# Patient Record
Sex: MALE | Race: WHITE | NOT HISPANIC OR LATINO | Employment: OTHER | URBAN - NONMETROPOLITAN AREA
[De-identification: names, ages, dates, MRNs, and addresses within clinical notes are randomized per-mention and may not be internally consistent; named-entity substitution may affect disease eponyms.]

---

## 2021-10-04 ENCOUNTER — APPOINTMENT (OUTPATIENT)
Dept: GENERAL RADIOLOGY | Facility: HOSPITAL | Age: 76
End: 2021-10-04

## 2021-10-04 ENCOUNTER — HOSPITAL ENCOUNTER (OUTPATIENT)
Facility: HOSPITAL | Age: 76
Setting detail: OBSERVATION
Discharge: HOME HEALTH | End: 2021-10-06
Attending: FAMILY MEDICINE | Admitting: FAMILY MEDICINE

## 2021-10-04 ENCOUNTER — APPOINTMENT (OUTPATIENT)
Dept: CT IMAGING | Facility: HOSPITAL | Age: 76
End: 2021-10-04

## 2021-10-04 DIAGNOSIS — Z78.9 DECREASED ACTIVITIES OF DAILY LIVING (ADL): ICD-10-CM

## 2021-10-04 DIAGNOSIS — R29.6 MULTIPLE FALLS: ICD-10-CM

## 2021-10-04 DIAGNOSIS — N39.0 ACUTE UTI: ICD-10-CM

## 2021-10-04 DIAGNOSIS — R68.89 DECREASED FUNCTIONAL ACTIVITY TOLERANCE: ICD-10-CM

## 2021-10-04 DIAGNOSIS — Z87.09 HISTORY OF CHRONIC OBSTRUCTIVE PULMONARY DISEASE: ICD-10-CM

## 2021-10-04 DIAGNOSIS — R53.1 GENERALIZED WEAKNESS: Primary | ICD-10-CM

## 2021-10-04 PROBLEM — R62.7 ADULT FAILURE TO THRIVE: Status: ACTIVE | Noted: 2021-10-04

## 2021-10-04 PROBLEM — E03.9 HYPOTHYROIDISM (ACQUIRED): Status: ACTIVE | Noted: 2021-10-04

## 2021-10-04 PROBLEM — H05.20 EXOPHTHALMOS: Status: ACTIVE | Noted: 2021-10-04

## 2021-10-04 LAB
ALBUMIN SERPL-MCNC: 3.4 G/DL (ref 3.5–5.2)
ALBUMIN/GLOB SERPL: 0.8 G/DL
ALP SERPL-CCNC: 70 U/L (ref 39–117)
ALT SERPL W P-5'-P-CCNC: 22 U/L (ref 1–41)
ANION GAP SERPL CALCULATED.3IONS-SCNC: 11 MMOL/L (ref 5–15)
ARTERIAL PATENCY WRIST A: ABNORMAL
AST SERPL-CCNC: 37 U/L (ref 1–40)
ATMOSPHERIC PRESS: 749 MMHG
BACTERIA UR QL AUTO: ABNORMAL /HPF
BASE EXCESS BLDA CALC-SCNC: 1.5 MMOL/L (ref 0–2)
BASOPHILS # BLD AUTO: 0.04 10*3/MM3 (ref 0–0.2)
BASOPHILS NFR BLD AUTO: 0.4 % (ref 0–1.5)
BDY SITE: ABNORMAL
BILIRUB SERPL-MCNC: 0.5 MG/DL (ref 0–1.2)
BILIRUB UR QL STRIP: NEGATIVE
BODY TEMPERATURE: 37 C
BUN SERPL-MCNC: 33 MG/DL (ref 8–23)
BUN/CREAT SERPL: 27 (ref 7–25)
CALCIUM SPEC-SCNC: 9 MG/DL (ref 8.6–10.5)
CHLORIDE SERPL-SCNC: 99 MMOL/L (ref 98–107)
CLARITY UR: ABNORMAL
CO2 SERPL-SCNC: 23 MMOL/L (ref 22–29)
COLOR UR: ABNORMAL
CREAT SERPL-MCNC: 1.22 MG/DL (ref 0.76–1.27)
D DIMER PPP FEU-MCNC: 1.57 MG/L (FEU) (ref 0–0.5)
D-LACTATE SERPL-SCNC: 1.4 MMOL/L (ref 0.5–2)
DEPRECATED RDW RBC AUTO: 41.8 FL (ref 37–54)
EOSINOPHIL # BLD AUTO: 0.02 10*3/MM3 (ref 0–0.4)
EOSINOPHIL NFR BLD AUTO: 0.2 % (ref 0.3–6.2)
ERYTHROCYTE [DISTWIDTH] IN BLOOD BY AUTOMATED COUNT: 13.3 % (ref 12.3–15.4)
GAS FLOW AIRWAY: 1.5 LPM
GFR SERPL CREATININE-BSD FRML MDRD: 58 ML/MIN/1.73
GLOBULIN UR ELPH-MCNC: 4.1 GM/DL
GLUCOSE SERPL-MCNC: 127 MG/DL (ref 65–99)
GLUCOSE UR STRIP-MCNC: NEGATIVE MG/DL
HCO3 BLDA-SCNC: 25.6 MMOL/L (ref 20–26)
HCT VFR BLD AUTO: 43.3 % (ref 37.5–51)
HGB BLD-MCNC: 14.2 G/DL (ref 13–17.7)
HGB UR QL STRIP.AUTO: ABNORMAL
HYALINE CASTS UR QL AUTO: ABNORMAL /LPF
IMM GRANULOCYTES # BLD AUTO: 0.06 10*3/MM3 (ref 0–0.05)
IMM GRANULOCYTES NFR BLD AUTO: 0.6 % (ref 0–0.5)
INR PPP: 1.16 (ref 0.91–1.09)
KETONES UR QL STRIP: ABNORMAL
LEUKOCYTE ESTERASE UR QL STRIP.AUTO: ABNORMAL
LYMPHOCYTES # BLD AUTO: 1.77 10*3/MM3 (ref 0.7–3.1)
LYMPHOCYTES NFR BLD AUTO: 18.2 % (ref 19.6–45.3)
Lab: ABNORMAL
MCH RBC QN AUTO: 27.9 PG (ref 26.6–33)
MCHC RBC AUTO-ENTMCNC: 32.8 G/DL (ref 31.5–35.7)
MCV RBC AUTO: 85.1 FL (ref 79–97)
MODALITY: ABNORMAL
MONOCYTES # BLD AUTO: 0.89 10*3/MM3 (ref 0.1–0.9)
MONOCYTES NFR BLD AUTO: 9.1 % (ref 5–12)
NEUTROPHILS NFR BLD AUTO: 6.95 10*3/MM3 (ref 1.7–7)
NEUTROPHILS NFR BLD AUTO: 71.5 % (ref 42.7–76)
NITRITE UR QL STRIP: NEGATIVE
NRBC BLD AUTO-RTO: 0 /100 WBC (ref 0–0.2)
NT-PROBNP SERPL-MCNC: 518.1 PG/ML (ref 0–1800)
PCO2 BLDA: 37.8 MM HG (ref 35–45)
PCO2 TEMP ADJ BLD: 37.8 MM HG (ref 35–45)
PH BLDA: 7.44 PH UNITS (ref 7.35–7.45)
PH UR STRIP.AUTO: 5.5 [PH] (ref 5–8)
PH, TEMP CORRECTED: 7.44 PH UNITS (ref 7.35–7.45)
PLATELET # BLD AUTO: 235 10*3/MM3 (ref 140–450)
PMV BLD AUTO: 10 FL (ref 6–12)
PO2 BLDA: 68.4 MM HG (ref 83–108)
PO2 TEMP ADJ BLD: 68.4 MM HG (ref 83–108)
POTASSIUM SERPL-SCNC: 4.2 MMOL/L (ref 3.5–5.2)
PROCALCITONIN SERPL-MCNC: 0.39 NG/ML (ref 0–0.25)
PROT SERPL-MCNC: 7.5 G/DL (ref 6–8.5)
PROT UR QL STRIP: ABNORMAL
PROTHROMBIN TIME: 14.4 SECONDS (ref 11.9–14.6)
RBC # BLD AUTO: 5.09 10*6/MM3 (ref 4.14–5.8)
RBC # UR: ABNORMAL /HPF
REF LAB TEST METHOD: ABNORMAL
SAO2 % BLDCOA: 94.7 % (ref 94–99)
SARS-COV-2 RNA PNL SPEC NAA+PROBE: NOT DETECTED
SODIUM SERPL-SCNC: 133 MMOL/L (ref 136–145)
SP GR UR STRIP: 1.02 (ref 1–1.03)
SQUAMOUS #/AREA URNS HPF: ABNORMAL /HPF
TROPONIN T SERPL-MCNC: <0.01 NG/ML (ref 0–0.03)
TSH SERPL DL<=0.05 MIU/L-ACNC: 2.53 UIU/ML (ref 0.27–4.2)
UROBILINOGEN UR QL STRIP: ABNORMAL
VENTILATOR MODE: ABNORMAL
WBC # BLD AUTO: 9.73 10*3/MM3 (ref 3.4–10.8)
WBC UR QL AUTO: ABNORMAL /HPF

## 2021-10-04 PROCEDURE — G0378 HOSPITAL OBSERVATION PER HR: HCPCS

## 2021-10-04 PROCEDURE — 83605 ASSAY OF LACTIC ACID: CPT | Performed by: NURSE PRACTITIONER

## 2021-10-04 PROCEDURE — 80053 COMPREHEN METABOLIC PANEL: CPT | Performed by: NURSE PRACTITIONER

## 2021-10-04 PROCEDURE — 0 IOPAMIDOL PER 1 ML: Performed by: NURSE PRACTITIONER

## 2021-10-04 PROCEDURE — 36600 WITHDRAWAL OF ARTERIAL BLOOD: CPT

## 2021-10-04 PROCEDURE — 99285 EMERGENCY DEPT VISIT HI MDM: CPT

## 2021-10-04 PROCEDURE — 85379 FIBRIN DEGRADATION QUANT: CPT | Performed by: NURSE PRACTITIONER

## 2021-10-04 PROCEDURE — 94799 UNLISTED PULMONARY SVC/PX: CPT

## 2021-10-04 PROCEDURE — 87077 CULTURE AEROBIC IDENTIFY: CPT | Performed by: NURSE PRACTITIONER

## 2021-10-04 PROCEDURE — 71045 X-RAY EXAM CHEST 1 VIEW: CPT

## 2021-10-04 PROCEDURE — 87635 SARS-COV-2 COVID-19 AMP PRB: CPT | Performed by: NURSE PRACTITIONER

## 2021-10-04 PROCEDURE — 87186 SC STD MICRODIL/AGAR DIL: CPT | Performed by: NURSE PRACTITIONER

## 2021-10-04 PROCEDURE — 84443 ASSAY THYROID STIM HORMONE: CPT | Performed by: NURSE PRACTITIONER

## 2021-10-04 PROCEDURE — C9803 HOPD COVID-19 SPEC COLLECT: HCPCS

## 2021-10-04 PROCEDURE — 83880 ASSAY OF NATRIURETIC PEPTIDE: CPT | Performed by: NURSE PRACTITIONER

## 2021-10-04 PROCEDURE — 85025 COMPLETE CBC W/AUTO DIFF WBC: CPT | Performed by: NURSE PRACTITIONER

## 2021-10-04 PROCEDURE — 93010 ELECTROCARDIOGRAM REPORT: CPT | Performed by: INTERNAL MEDICINE

## 2021-10-04 PROCEDURE — 71275 CT ANGIOGRAPHY CHEST: CPT

## 2021-10-04 PROCEDURE — 70450 CT HEAD/BRAIN W/O DYE: CPT

## 2021-10-04 PROCEDURE — 84145 PROCALCITONIN (PCT): CPT | Performed by: NURSE PRACTITIONER

## 2021-10-04 PROCEDURE — 93005 ELECTROCARDIOGRAM TRACING: CPT

## 2021-10-04 PROCEDURE — 82803 BLOOD GASES ANY COMBINATION: CPT

## 2021-10-04 PROCEDURE — 81001 URINALYSIS AUTO W/SCOPE: CPT | Performed by: NURSE PRACTITIONER

## 2021-10-04 PROCEDURE — 25010000002 CEFTRIAXONE PER 250 MG: Performed by: NURSE PRACTITIONER

## 2021-10-04 PROCEDURE — 87086 URINE CULTURE/COLONY COUNT: CPT | Performed by: NURSE PRACTITIONER

## 2021-10-04 PROCEDURE — 85610 PROTHROMBIN TIME: CPT | Performed by: NURSE PRACTITIONER

## 2021-10-04 PROCEDURE — 87040 BLOOD CULTURE FOR BACTERIA: CPT | Performed by: NURSE PRACTITIONER

## 2021-10-04 PROCEDURE — 84484 ASSAY OF TROPONIN QUANT: CPT | Performed by: NURSE PRACTITIONER

## 2021-10-04 RX ORDER — ACETAMINOPHEN 325 MG/1
650 TABLET ORAL EVERY 4 HOURS PRN
Status: DISCONTINUED | OUTPATIENT
Start: 2021-10-04 | End: 2021-10-06 | Stop reason: HOSPADM

## 2021-10-04 RX ORDER — SODIUM CHLORIDE 0.9 % (FLUSH) 0.9 %
10 SYRINGE (ML) INJECTION AS NEEDED
Status: DISCONTINUED | OUTPATIENT
Start: 2021-10-04 | End: 2021-10-06 | Stop reason: HOSPADM

## 2021-10-04 RX ORDER — ONDANSETRON 4 MG/1
4 TABLET, FILM COATED ORAL EVERY 6 HOURS PRN
Status: DISCONTINUED | OUTPATIENT
Start: 2021-10-04 | End: 2021-10-06 | Stop reason: HOSPADM

## 2021-10-04 RX ORDER — LEVOTHYROXINE SODIUM 0.1 MG/1
100 TABLET ORAL
Status: DISCONTINUED | OUTPATIENT
Start: 2021-10-05 | End: 2021-10-06 | Stop reason: HOSPADM

## 2021-10-04 RX ORDER — SODIUM CHLORIDE, SODIUM LACTATE, POTASSIUM CHLORIDE, CALCIUM CHLORIDE 600; 310; 30; 20 MG/100ML; MG/100ML; MG/100ML; MG/100ML
75 INJECTION, SOLUTION INTRAVENOUS CONTINUOUS
Status: DISCONTINUED | OUTPATIENT
Start: 2021-10-04 | End: 2021-10-06 | Stop reason: HOSPADM

## 2021-10-04 RX ORDER — IPRATROPIUM BROMIDE AND ALBUTEROL SULFATE 2.5; .5 MG/3ML; MG/3ML
3 SOLUTION RESPIRATORY (INHALATION) EVERY 6 HOURS PRN
Status: DISCONTINUED | OUTPATIENT
Start: 2021-10-04 | End: 2021-10-06 | Stop reason: HOSPADM

## 2021-10-04 RX ORDER — BUDESONIDE 0.5 MG/2ML
0.5 INHALANT ORAL
Status: DISCONTINUED | OUTPATIENT
Start: 2021-10-05 | End: 2021-10-06 | Stop reason: HOSPADM

## 2021-10-04 RX ORDER — ACETAMINOPHEN 160 MG/5ML
650 SOLUTION ORAL EVERY 4 HOURS PRN
Status: DISCONTINUED | OUTPATIENT
Start: 2021-10-04 | End: 2021-10-06 | Stop reason: HOSPADM

## 2021-10-04 RX ORDER — ALBUTEROL SULFATE 2.5 MG/3ML
2.5 SOLUTION RESPIRATORY (INHALATION) EVERY 4 HOURS PRN
COMMUNITY

## 2021-10-04 RX ORDER — SODIUM CHLORIDE 0.9 % (FLUSH) 0.9 %
10 SYRINGE (ML) INJECTION EVERY 12 HOURS SCHEDULED
Status: DISCONTINUED | OUTPATIENT
Start: 2021-10-04 | End: 2021-10-06 | Stop reason: HOSPADM

## 2021-10-04 RX ORDER — AMLODIPINE BESYLATE 10 MG/1
10 TABLET ORAL DAILY
Status: DISCONTINUED | OUTPATIENT
Start: 2021-10-05 | End: 2021-10-06 | Stop reason: HOSPADM

## 2021-10-04 RX ORDER — PROPRANOLOL HYDROCHLORIDE 10 MG/1
10 TABLET ORAL EVERY 12 HOURS SCHEDULED
Status: DISCONTINUED | OUTPATIENT
Start: 2021-10-04 | End: 2021-10-06 | Stop reason: HOSPADM

## 2021-10-04 RX ORDER — LISINOPRIL 10 MG/1
10 TABLET ORAL DAILY
Status: DISCONTINUED | OUTPATIENT
Start: 2021-10-05 | End: 2021-10-06 | Stop reason: HOSPADM

## 2021-10-04 RX ORDER — ONDANSETRON 2 MG/ML
4 INJECTION INTRAMUSCULAR; INTRAVENOUS EVERY 6 HOURS PRN
Status: DISCONTINUED | OUTPATIENT
Start: 2021-10-04 | End: 2021-10-06 | Stop reason: HOSPADM

## 2021-10-04 RX ADMIN — Medication 10 ML: at 22:28

## 2021-10-04 RX ADMIN — SODIUM CHLORIDE 500 ML: 9 INJECTION, SOLUTION INTRAVENOUS at 19:00

## 2021-10-04 RX ADMIN — PROPRANOLOL HYDROCHLORIDE 10 MG: 10 TABLET ORAL at 22:28

## 2021-10-04 RX ADMIN — SODIUM CHLORIDE 1 G: 9 INJECTION, SOLUTION INTRAVENOUS at 19:01

## 2021-10-04 RX ADMIN — IOPAMIDOL 100 ML: 755 INJECTION, SOLUTION INTRAVENOUS at 18:03

## 2021-10-04 RX ADMIN — SODIUM CHLORIDE, POTASSIUM CHLORIDE, SODIUM LACTATE AND CALCIUM CHLORIDE 75 ML/HR: 600; 310; 30; 20 INJECTION, SOLUTION INTRAVENOUS at 22:28

## 2021-10-04 NOTE — H&P
"    [facility]  HISTORY AND PHYSICAL    Date of Admission: 10/4/2021  Primary Care Physician: Ty Beal APRN    Subjective     Chief Complaint: Frequent falls and weakness    History of Present Illness  Angel Hernandez is a 76-year-old male with a past medical history of COPD, chronic respiratory failure on 3-4 L nasal cannula continuously, hypothyroidism, hyperlipidemia, hypertension.  Patient states he underwent cystoscopy per Dr. Medina at [facility] 7 days ago.  He presented to [facility] emergency department via EMS with complaints of 8-10 falls over the past 3 days.  He is reporting extreme weakness.  He is complaining of soreness in his shoulders and knees.  He has signs of injury on his upper extremities with healing areas of skin tears and bruising.  Patient states \"my brain scattered\".  Patient is extremely poor historian.  [facility]  ER work-up revealed PO2 of 1.5 L nasal cannula of 68.  GFR 58, sodium 133, a significant UTI.  Elevated D-dimer-CT of the chest positive for cholelithiasis, emphysema and COPD, negative PE, incidental finding 2 cm nodule on the left renal contour.  Patient has no other complaints.  He is admitted for further evaluation treatment.    Review of Systems   A 10 point review of systems was completed, all negative except for those discussed in HPI    Past Medical History:   Past Medical History:   Diagnosis Date   • Asthma    • COPD (chronic obstructive pulmonary disease) (HCC)    • Disease of thyroid gland    • Hyperlipidemia    • Hypertension        Past Surgical History:   Past Surgical History:   Procedure Laterality Date   • COLONOSCOPY  05/09/2011    unremarkable   • FRACTURE SURGERY      jaw   • WRIST SURGERY         Family History: family history includes Arthritis in his father; No Known Problems in his mother.  Patient states he is unable to remember " "his mother's history.  The only thing he remember about his father is he had arthritis and  in a relatively young age and MVA.    Social History:  reports that he has quit smoking. He has never used smokeless tobacco. He reports previous alcohol use.    Code Status: Full, patient states he has no one to speak for him if unable to pick for himself      Allergies:  No Known Allergies    Medications:  Prior to Admission medications    Medication Sig Start Date End Date Taking? Authorizing Provider   amLODIPine (NORVASC) 10 MG tablet Take 10 mg by mouth Daily.    Geeta Martinez MD   ipratropium-albuterol (COMBIVENT RESPIMAT)  MCG/ACT inhaler Inhale 1 puff 4 (Four) Times a Day As Needed for Wheezing.    Geeta Martinez MD   LEVOTHYROXINE SODIUM PO Take  by mouth.    Geeta Martinez MD   lisinopril (PRINIVIL,ZESTRIL) 10 MG tablet Take 10 mg by mouth Daily.    Geeta Martinez MD   mometasone (ASMANEX TWISTHALER) inhaler 220 mcg/inhalation Inhale 2 puffs Daily.    Geeta Martinez MD   propranolol (INDERAL) 10 MG tablet Take 10 mg by mouth 2 (Two) Times a Day.    Geeta Martinez MD       Objective     /61 (Patient Position: Standing)   Pulse 70   Temp 97.8 °F (36.6 °C) (Oral)   Resp 21   Ht 172.7 cm (68\")   Wt 84.8 kg (187 lb)   SpO2 96%   BMI 28.43 kg/m²   Physical Exam  Vitals reviewed.   Constitutional:       Appearance: He is ill-appearing.   HENT:      Head: Normocephalic and atraumatic.      Mouth/Throat:      Mouth: Mucous membranes are moist.      Pharynx: Oropharynx is clear.      Comments: Edentulous  Eyes:      Extraocular Movements: Extraocular movements intact.      Conjunctiva/sclera:      Right eye: Right conjunctiva is injected.      Left eye: Left conjunctiva is injected.      Pupils: Pupils are equal, round, and reactive to light.      Comments: exophthalmus    Cardiovascular:      Rate and Rhythm: Normal rate and regular rhythm.   Pulmonary:      " Effort: Pulmonary effort is normal.      Comments: Diminished throughout without adventitious breath sounds  Abdominal:      General: Bowel sounds are normal.      Palpations: Abdomen is soft.   Musculoskeletal:      Cervical back: Normal range of motion and neck supple.      Comments: Generalized weakness and debility   Skin:     General: Skin is warm and dry.      Findings: Bruising present.             Comments: Multiple areas of bruising, skin tears noted left upper extremity   Neurological:      General: No focal deficit present.      Mental Status: He is alert and oriented to person, place, and time.      Comments: Extremely poor memory, poor historian   Psychiatric:         Mood and Affect: Mood normal.         Behavior: Behavior normal.       Pertinent Data:   Lab Results (last 72 hours)     Procedure Component Value Units Date/Time    Lactic Acid, Plasma [[id number]] Collected: 10/04/21 1901    Specimen: Blood Updated: 10/04/21 1909    Blood Culture - Blood, Arm, Left [[id number]] Collected: 10/04/21 1821    Specimen: Blood from Arm, Left Updated: 10/04/21 1836    COVID-19,[facility] IN-HOUSE,Nasal Swab No Transport Media 3-4 HR TAT - Swab, Nasal Cavity [[id number]]  (Normal) Collected: 10/04/21 1639    Specimen: Swab from Nasal Cavity Updated: 10/04/21 1734     COVID19 Not Detected    Urinalysis With Culture If Indicated - Urine, Clean Catch [[id number]]  (Abnormal) Collected: 10/04/21 1719    Specimen: Urine, Clean Catch Updated: 10/04/21 1729     Color, UA Dark Yellow     Appearance, UA Cloudy     pH, UA 5.5     Specific Gravity, UA 1.022     Glucose, UA Negative     Ketones, UA 15 mg/dL (1+)     Bilirubin, UA Negative     Blood, UA Large (3+)     Protein, UA >=300 mg/dL (3+)     Leuk Esterase, UA Moderate (2+)     Nitrite, UA Negative     Urobilinogen, UA 1.0 E.U./dL    Urinalysis, Microscopic Only - Urine, Clean Catch [[id number]]  (Abnormal) Collected: 10/04/21 1719    Specimen: Urine, Clean Catch Updated:  10/04/21 1729     RBC, UA 6-12 /HPF      WBC, UA Too Numerous to Count /HPF      Bacteria, UA 4+ /HPF      Squamous Epithelial Cells, UA 0-2 /HPF      Hyaline Casts, UA 0-2 /LPF      Methodology Automated Microscopy    Urine Culture - Urine, Urine, Clean Catch [[id number]] Collected: 10/04/21 1719    Specimen: Urine, Clean Catch Updated: 10/04/21 1729    TSH [[id number]]  (Normal) Collected: 10/04/21 1637    Specimen: Blood Updated: 10/04/21 1716     TSH 2.530 uIU/mL     Comprehensive Metabolic Panel [[id number]]  (Abnormal) Collected: 10/04/21 1637    Specimen: Blood Updated: 10/04/21 1712     Glucose 127 mg/dL      BUN 33 mg/dL      Creatinine 1.22 mg/dL      Sodium 133 mmol/L      Potassium 4.2 mmol/L      Chloride 99 mmol/L      CO2 23.0 mmol/L      Calcium 9.0 mg/dL      Total Protein 7.5 g/dL      Albumin 3.40 g/dL      ALT (SGPT) 22 U/L      AST (SGOT) 37 U/L      Alkaline Phosphatase 70 U/L      Total Bilirubin 0.5 mg/dL      eGFR Non African Amer 58 mL/min/1.73      Globulin 4.1 gm/dL      A/G Ratio 0.8 g/dL      BUN/Creatinine Ratio 27.0     Anion Gap 11.0 mmol/L     Troponin [[id number]]  (Normal) Collected: 10/04/21 1637    Specimen: Blood Updated: 10/04/21 1710     Troponin T <0.010 ng/mL     BNP [[id number]]  (Normal) Collected: 10/04/21 1637    Specimen: Blood Updated: 10/04/21 1709     proBNP 518.1 pg/mL     D-dimer, Quantitative [[id number]]  (Abnormal) Collected: 10/04/21 1637    Specimen: Blood Updated: 10/04/21 1709     D-Dimer, Quantitative 1.57 mg/L (FEU)     Protime-INR [[id number]]  (Abnormal) Collected: 10/04/21 1637    Specimen: Blood Updated: 10/04/21 1709     Protime 14.4 Seconds      INR 1.16    CBC Auto Differential [[id number]]  (Abnormal) Collected: 10/04/21 1637    Specimen: Blood Updated: 10/04/21 1651     WBC 9.73 10*3/mm3      RBC 5.09 10*6/mm3      Hemoglobin 14.2 g/dL      Hematocrit 43.3 %      MCV 85.1 fL      MCH 27.9 pg      MCHC 32.8 g/dL      RDW 13.3 %      RDW-SD 41.8 fl       MPV 10.0 fL      Platelets 235 10*3/mm3      Neutrophil % 71.5 %      Lymphocyte % 18.2 %      Monocyte % 9.1 %      Eosinophil % 0.2 %      Basophil % 0.4 %      Immature Grans % 0.6 %      Neutrophils, Absolute 6.95 10*3/mm3      Lymphocytes, Absolute 1.77 10*3/mm3      Monocytes, Absolute 0.89 10*3/mm3      Eosinophils, Absolute 0.02 10*3/mm3      Basophils, Absolute 0.04 10*3/mm3      Immature Grans, Absolute 0.06 10*3/mm3      nRBC 0.0 /100 WBC     Blood Gas, Arterial - [[id number]]  (Abnormal) Collected: 10/04/21 1640    Specimen: Arterial Blood Updated: 10/04/21 1648     Site Right Brachial     Quinten's Test N/A     pH, Arterial 7.439 pH units      pCO2, Arterial 37.8 mm Hg      pO2, Arterial 68.4 mm Hg      Comment: 84 Value below reference range        HCO3, Arterial 25.6 mmol/L      Base Excess, Arterial 1.5 mmol/L      O2 Saturation, Arterial 94.7 %      Temperature 37.0 C      Barometric Pressure for Blood Gas 749 mmHg      Modality Nasal Cannula     Flow Rate 1.5 lpm      Ventilator Mode NA     Collected by [id number]     Comment: Meter: [id number]     :  [id number]        pCO2, Temperature Corrected 37.8 mm Hg      pH, Temp Corrected 7.439 pH Units      pO2, Temperature Corrected 68.4 mm Hg         Imaging Results (Last 24 Hours)     Procedure Component Value Units Date/Time    CT Angiogram Chest [[id number]] Collected: 10/04/21 1818     Updated: 10/04/21 1827    Narrative:      CT ANGIOGRAM CHEST- 10/4/2021 5:49 PM CDT      HISTORY: generalized weakness/ dyspnea/ elvated dimer      COMPARISON: None.      DOSE LENGTH PRODUCT: 407 mGy cm. Automated exposure control was also  utilized to decrease patient radiation dose.     TECHNIQUE: Helical tomographic images of the chest were obtained after  the administration of intravenous contrast following angiogram protocol.  Additionally, 3D and multiplanar reformatted images were provided.        FINDINGS:    Pulmonary arteries: There is adequate  enhancement of the pulmonary  arteries to evaluate for central and segmental pulmonary emboli. There  are no filling defects within the main, lobar, segmental or visualized  subsegmental pulmonary arteries. The pulmonary arteries are within  normal limits for size.      Aorta and great vessels: Vascular calcifications present aortic arch..  The great vessels are normal in appearance.     Visualized neck base: The imaged portion of the base of the neck appears  unremarkable.      Lungs: Emphysematous changes noted in the pulmonary parenchyma.. The  trachea and bronchial tree are patent.      Heart: The heart is normal in size. Coronary calcifications are  present..      Mediastinum and lymph nodes: No enlarged mediastinal, hilar, or axillary  lymph nodes are present.      Skeletal and soft tissues: The osseous structures of the thorax and  surrounding soft tissues demonstrate no acute process.     Upper abdomen: Calculi are present in the gallbladder..        Impression:      1. No evidence of pulmonary embolus.        2. Emphysema and COPD.  3. Cholelithiasis  4. 2 cm nodule on the left renal contour is incompletely evaluated..        This report was finalized on 10/04/2021 18:24 by Dr. Austin Monzon MD.    CT Head Without Contrast [[id number]] Collected: 10/04/21 1815     Updated: 10/04/21 1820    Narrative:      EXAMINATION:   CT HEAD WO CONTRAST-  10/4/2021 6:15 PM CDT     HISTORY: Patient fell CT BRAIN without contrast 10/4/2021 5:49 PM CDT     HISTORY: Generalized weakness     COMPARISON: None      DLP: 700 mGy cm. In order to have a CT radiation dose as low as  reasonably achievable, Automated Exposure Control was utilized for  adjustment of the mA and/or KV according to patient size.     TECHNIQUE: Serial axial tomographic images of the brain were obtained  without the use of intravenous contrast.      FINDINGS:   The midline structures are nondisplaced. There is mild cerebral and  cerebellar volume loss,  with an associated increase in the prominence of  the ventricles and sulci. The basilar cisterns are normal in size and  configuration. There is no evidence of intracranial hemorrhage or  mass-effect. There is low attenuation in the periventricular white  matter, consistent with chronic ischemic change. Old lacunar infarctions  noted right basal ganglia There are no abnormal extra-axial fluid  collections. There is no evidence of tonsillar herniation.      Exophthalmus is noted.. The visualized paranasal sinuses, mastoid air  cells and middle ear cavities are clear. The visualized osseous  structures and overlying soft tissues of the skull and face are intact.        Impression:      Changes of aging with no acute intracranial abnormality..        2. Exophthalmos        This report was finalized on 10/04/2021 18:17 by Dr. Austin Monzon MD.    XR Chest 1 View [[id number]] Collected: 10/04/21 1629     Updated: 10/04/21 1633    Narrative:      EXAMINATION: XR CHEST 1 VW-     10/4/2021 4:26 PM CDT     HISTORY: dyspnea     1 view chest x-ray.     No comparison.     Heart size is normal.  The mediastinum is within normal limits.      The lungs are mildly hyperexpanded with no pneumonia or pneumothorax.  Mild chronic appearing interstitial disease with a few calcified  granulomas.  No congestive failure changes.                                                                       Impression:      1. No acute disease.        This report was finalized on 10/04/2021 16:30 by Dr. Harsh Jama MD.          Assessment / Plan     Assessment:   Active Hospital Problems    Diagnosis    • **Acute UTI (urinary tract infection)    • Generalized weakness    • Frequent falls    • Adult failure to thrive    • Hypothyroidism (acquired)    • Exophthalmos    Incidental finding-2 cm left renal nodule  UTI status post cystoscopy 9/28/2021    Plan:   1.  Admit as duration  2.  Continue Rocephin 1 g IV daily  3.  Gentle hydration LR at 75  mL/hour  4.  Home medications reviewed and restarted as appropriate, unsure patient's list is correct  5.  DVT prophylaxis with SCDs  6.  Official tears every 4 hours  7.  Supplemental oxygen, continuous pulse oximetry, ABGs as needed, incentive spirometry, duo nebs as needed for shortness of breathing or wheezing  8.  Limited renal ultrasound-left renal nodule, left lower extremity venous Doppler study in a.m.  9.  Labs in a.m.  10.  Consult PT and OT for strengthening  11.  Consult , patient may need home health    I discussed the patient's findings and my recommendations with: Yair Zamora MD  Time spent 40 minutes    Patient seen and examined by me on 10/4/2021 at 6:35 PM.    Electronically signed by AYAKA Dinh, 10/04/21, 19:26 CDT.

## 2021-10-04 NOTE — ED PROVIDER NOTES
Subjective   Patient is a 76-year-old white male presents the emergency department with generalized weakness and increased shortness of breath over the past 4 days.  Patient states that he has fallen several times over the past 2 days due to generalized weakness.  He states today that he has been short of breath even though he has had his oxygen on all day.  He states he is so weak he can hardly stand.  He denies any chest pain.  He denies any fever or chills.  No nausea or vomiting.  No focal weakness      History provided by:  Patient   used: No        Review of Systems   Constitutional: Negative.    HENT: Negative.    Eyes: Negative.    Respiratory:        Patient is a 76-year-old white male presents the emergency department with generalized weakness and increased shortness of breath over the past 4 days.  Patient states that he has fallen several times over the past 2 days due to generalized weakness.  He states today that he has been short of breath even though he has had his oxygen on all day.  He states he is so weak he can hardly stand.  He denies any chest pain.  He denies any fever or chills.  No nausea or vomiting.  No focal weakness.  Patient is a past smoker.  He states he stopped smoking about 2 years ago.  He does have a history of COPD and is oxygen dependent at home.     Cardiovascular: Negative.    Gastrointestinal: Negative.    Endocrine: Negative.    Genitourinary: Negative.    Musculoskeletal: Negative.    Skin: Negative.    Allergic/Immunologic: Negative.    Neurological: Negative.    Hematological: Negative.    Psychiatric/Behavioral: Negative.    All other systems reviewed and are negative.      Past Medical History:   Diagnosis Date   • Asthma    • COPD (chronic obstructive pulmonary disease) (HCC)    • Disease of thyroid gland    • Hyperlipidemia    • Hypertension        No Known Allergies    Past Surgical History:   Procedure Laterality Date   • COLONOSCOPY  05/09/2011  "   unremarkable   • FRACTURE SURGERY      jaw   • WRIST SURGERY         Family History   Problem Relation Age of Onset   • No Known Problems Mother    • Arthritis Father        Social History     Socioeconomic History   • Marital status:      Spouse name: Not on file   • Number of children: Not on file   • Years of education: Not on file   • Highest education level: Not on file   Tobacco Use   • Smoking status: Former Smoker   • Smokeless tobacco: Never Used   Substance and Sexual Activity   • Alcohol use: Not Currently   • Sexual activity: Defer       Prior to Admission medications    Medication Sig Start Date End Date Taking? Authorizing Provider   amLODIPine (NORVASC) 10 MG tablet Take 10 mg by mouth Daily.    Geeta Martinez MD   ipratropium-albuterol (COMBIVENT RESPIMAT)  MCG/ACT inhaler Inhale 1 puff 4 (Four) Times a Day As Needed for Wheezing.    Geeta Martinez MD   LEVOTHYROXINE SODIUM PO Take  by mouth.    Geeta Martinez MD   lisinopril (PRINIVIL,ZESTRIL) 10 MG tablet Take 10 mg by mouth Daily.    Geeta Martinez MD   mometasone (ASMANEX TWISTHALER) inhaler 220 mcg/inhalation Inhale 2 puffs Daily.    Geeta Martinez MD   propranolol (INDERAL) 10 MG tablet Take 10 mg by mouth 2 (Two) Times a Day.    ProviderGeeta MD       /61 (Patient Position: Standing)   Pulse 70   Temp 97.8 °F (36.6 °C) (Oral)   Resp 21   Ht 172.7 cm (68\")   Wt 84.8 kg (187 lb)   SpO2 96%   BMI 28.43 kg/m²     Objective   Physical Exam  Vitals and nursing note reviewed.   Constitutional:       Appearance: He is well-developed.      Comments: Chronically ill-appearing.  No acute distress.  Nontoxic-appearing   HENT:      Head: Normocephalic and atraumatic.      Comments: There is some ecchymosis noted to the right frontal forehead.  Eyes:      Conjunctiva/sclera: Conjunctivae normal.      Pupils: Pupils are equal, round, and reactive to light.      Comments: exophthalmus " noted    Cardiovascular:      Rate and Rhythm: Normal rate and regular rhythm.      Heart sounds: Normal heart sounds.   Pulmonary:      Effort: Pulmonary effort is normal.      Comments: Expiratory wheezing noted throughout.  There is some diminished lung sounds bilateral bases  Abdominal:      General: Bowel sounds are normal.      Palpations: Abdomen is soft.   Musculoskeletal:         General: Normal range of motion.      Cervical back: Normal range of motion and neck supple.      Right lower leg: Edema present.      Left lower leg: Edema present.   Skin:     General: Skin is warm and dry.   Neurological:      Mental Status: He is alert and oriented to person, place, and time.      Deep Tendon Reflexes: Reflexes are normal and symmetric.   Psychiatric:         Behavior: Behavior normal.         Thought Content: Thought content normal.         Judgment: Judgment normal.         Procedures         Lab Results (last 24 hours)     Procedure Component Value Units Date/Time    Comprehensive Metabolic Panel [[id number]]  (Abnormal) Collected: 10/04/21 1637    Specimen: Blood Updated: 10/04/21 1712     Glucose 127 mg/dL      BUN 33 mg/dL      Creatinine 1.22 mg/dL      Sodium 133 mmol/L      Potassium 4.2 mmol/L      Chloride 99 mmol/L      CO2 23.0 mmol/L      Calcium 9.0 mg/dL      Total Protein 7.5 g/dL      Albumin 3.40 g/dL      ALT (SGPT) 22 U/L      AST (SGOT) 37 U/L      Alkaline Phosphatase 70 U/L      Total Bilirubin 0.5 mg/dL      eGFR Non African Amer 58 mL/min/1.73      Globulin 4.1 gm/dL      A/G Ratio 0.8 g/dL      BUN/Creatinine Ratio 27.0     Anion Gap 11.0 mmol/L     Narrative:      GFR Normal >60  Chronic Kidney Disease <60  Kidney Failure <15      CBC & Differential [[id number]]  (Abnormal) Collected: 10/04/21 1637    Specimen: Blood Updated: 10/04/21 1651    Narrative:      The following orders were created for panel order CBC & Differential.  Procedure                               Abnormality          Status                     ---------                               -----------         ------                     CBC Auto Differential[[id number]]        Abnormal            Final result                 Please view results for these tests on the individual orders.    Protime-INR [[id number]]  (Abnormal) Collected: 10/04/21 1637    Specimen: Blood Updated: 10/04/21 1709     Protime 14.4 Seconds      INR 1.16    BNP [[id number]]  (Normal) Collected: 10/04/21 1637    Specimen: Blood Updated: 10/04/21 1709     proBNP 518.1 pg/mL     Narrative:      Among patients with dyspnea, NT-proBNP is highly sensitive for the detection of acute congestive heart failure. In addition NT-proBNP of <300 pg/ml effectively rules out acute congestive heart failure with 99% negative predictive value.    Results may be falsely decreased if patient taking Biotin.      Troponin [[id number]]  (Normal) Collected: 10/04/21 1637    Specimen: Blood Updated: 10/04/21 1710     Troponin T <0.010 ng/mL     Narrative:      Troponin T Reference Range:  <= 0.03 ng/mL-   Negative for AMI  >0.03 ng/mL-     Abnormal for myocardial necrosis.  Clinicians would have to utilize clinical acumen, EKG, Troponin and serial changes to determine if it is an Acute Myocardial Infarction or myocardial injury due to an underlying chronic condition.       Results may be falsely decreased if patient taking Biotin.      D-dimer, Quantitative [[id number]]  (Abnormal) Collected: 10/04/21 1637    Specimen: Blood Updated: 10/04/21 1709     D-Dimer, Quantitative 1.57 mg/L (FEU)     Narrative:      Reference Range is 0-0.50 mg/L FEU. However, results <0.50 mg/L FEU tends to rule out DVT or PE. Results >0.50 mg/L FEU are not useful in predicting absence or presence of DVT or PE.      CBC Auto Differential [[id number]]  (Abnormal) Collected: 10/04/21 1637    Specimen: Blood Updated: 10/04/21 1651     WBC 9.73 10*3/mm3      RBC 5.09 10*6/mm3      Hemoglobin 14.2 g/dL      Hematocrit  43.3 %      MCV 85.1 fL      MCH 27.9 pg      MCHC 32.8 g/dL      RDW 13.3 %      RDW-SD 41.8 fl      MPV 10.0 fL      Platelets 235 10*3/mm3      Neutrophil % 71.5 %      Lymphocyte % 18.2 %      Monocyte % 9.1 %      Eosinophil % 0.2 %      Basophil % 0.4 %      Immature Grans % 0.6 %      Neutrophils, Absolute 6.95 10*3/mm3      Lymphocytes, Absolute 1.77 10*3/mm3      Monocytes, Absolute 0.89 10*3/mm3      Eosinophils, Absolute 0.02 10*3/mm3      Basophils, Absolute 0.04 10*3/mm3      Immature Grans, Absolute 0.06 10*3/mm3      nRBC 0.0 /100 WBC     TSH [[id number]]  (Normal) Collected: 10/04/21 1637    Specimen: Blood Updated: 10/04/21 1716     TSH 2.530 uIU/mL     Procalcitonin [[id number]] Collected: 10/04/21 1637    Specimen: Blood Updated: 10/04/21 1916    COVID PRE-OP / PRE-PROCEDURE SCREENING ORDER (NO ISOLATION) - Swab, Nasal Cavity [[id number]]  (Normal) Collected: 10/04/21 1639    Specimen: Swab from Nasal Cavity Updated: 10/04/21 1734    Narrative:      The following orders were created for panel order COVID PRE-OP / PRE-PROCEDURE SCREENING ORDER (NO ISOLATION) - Swab, Nasal Cavity.  Procedure                               Abnormality         Status                     ---------                               -----------         ------                     COVID-19,[facility]...[[id number]]  Normal              Final result                 Please view results for these tests on the individual orders.    COVID-19,[facility] IN-HOUSE,Nasal Swab No Transport Media 3-4 HR TAT - Swab, Nasal Cavity [[id number]]  (Normal) Collected: 10/04/21 1639    Specimen: Swab from Nasal Cavity Updated: 10/04/21 1734     COVID19 Not Detected    Narrative:      Fact sheet for providers: https://www.fda.gov/media/047920/download     Fact sheet for patients: https://www.fda.gov/media/014757/download    Test performed by PCR.    Consider negative results in combination with clinical observations, patient history, and  epidemiological information.  Fact sheet for providers: https://www.fda.gov/media/118912/download     Fact sheet for patients: https://www.fda.gov/media/025362/download    Test performed by PCR.    Consider negative results in combination with clinical observations, patient history, and epidemiological information.    Blood Gas, Arterial - [[id number]]  (Abnormal) Collected: 10/04/21 1640    Specimen: Arterial Blood Updated: 10/04/21 1648     Site Right Brachial     Quinten's Test N/A     pH, Arterial 7.439 pH units      pCO2, Arterial 37.8 mm Hg      pO2, Arterial 68.4 mm Hg      Comment: 84 Value below reference range        HCO3, Arterial 25.6 mmol/L      Base Excess, Arterial 1.5 mmol/L      O2 Saturation, Arterial 94.7 %      Temperature 37.0 C      Barometric Pressure for Blood Gas 749 mmHg      Modality Nasal Cannula     Flow Rate 1.5 lpm      Ventilator Mode NA     Collected by [id number]     Comment: Meter: [id number]     :  [id number]        pCO2, Temperature Corrected 37.8 mm Hg      pH, Temp Corrected 7.439 pH Units      pO2, Temperature Corrected 68.4 mm Hg     Urinalysis With Culture If Indicated - Urine, Clean Catch [[id number]]  (Abnormal) Collected: 10/04/21 1719    Specimen: Urine, Clean Catch Updated: 10/04/21 1729     Color, UA Dark Yellow     Appearance, UA Cloudy     pH, UA 5.5     Specific Gravity, UA 1.022     Glucose, UA Negative     Ketones, UA 15 mg/dL (1+)     Bilirubin, UA Negative     Blood, UA Large (3+)     Protein, UA >=300 mg/dL (3+)     Leuk Esterase, UA Moderate (2+)     Nitrite, UA Negative     Urobilinogen, UA 1.0 E.U./dL    Urinalysis, Microscopic Only - Urine, Clean Catch [[id number]]  (Abnormal) Collected: 10/04/21 1719    Specimen: Urine, Clean Catch Updated: 10/04/21 1729     RBC, UA 6-12 /HPF      WBC, UA Too Numerous to Count /HPF      Bacteria, UA 4+ /HPF      Squamous Epithelial Cells, UA 0-2 /HPF      Hyaline Casts, UA 0-2 /LPF      Methodology Automated Microscopy     Urine Culture - Urine, Urine, Clean Catch [[id number]] Collected: 10/04/21 1719    Specimen: Urine, Clean Catch Updated: 10/04/21 1729    Blood Culture - Blood, Arm, Left [[id number]] Collected: 10/04/21 1821    Specimen: Blood from Arm, Left Updated: 10/04/21 1836    Lactic Acid, Plasma [[id number]] Collected: 10/04/21 1901    Specimen: Blood Updated: 10/04/21 1909          CT Head Without Contrast   Final Result   Changes of aging with no acute intracranial abnormality..           2. Exophthalmos           This report was finalized on 10/04/2021 18:17 by Dr. Austin Monzon MD.      CT Angiogram Chest   Final Result   1. No evidence of pulmonary embolus.           2. Emphysema and COPD.   3. Cholelithiasis   4. 2 cm nodule on the left renal contour is incompletely evaluated..           This report was finalized on 10/04/2021 18:24 by Dr. Austin Monzon MD.      XR Chest 1 View   Final Result   1. No acute disease.           This report was finalized on 10/04/2021 16:30 by Dr. Harsh Jama MD.      US Venous Doppler Lower Extremity Right (duplex)    (Results Pending)   US Renal Limited    (Results Pending)       ED Course  ED Course as of Oct 04 1926   Mon Oct 04, 2021   1829 Reviewed pt and pt care plan with dr montenegro- also in agreement with pt care plan. Pt states that he feels very weak all over. Call placed to hospitalist at this time     [CW]   1839 Spoke with dr victor- has accepted for admission. Will be admitted soon in stable condition     [CW]      ED Course User Index  [CW] Elma Ervin, APRN          MDM  Number of Diagnoses or Management Options  Acute UTI: new and requires workup  Generalized weakness: new and requires workup  History of chronic obstructive pulmonary disease: minor  Multiple falls: new and requires workup     Amount and/or Complexity of Data Reviewed  Clinical lab tests: ordered and reviewed  Tests in the radiology section of CPT®: ordered and reviewed    Patient  Progress  Patient progress: stable      Final diagnoses:   Generalized weakness   Acute UTI   Multiple falls   History of chronic obstructive pulmonary disease          Elma Ervin, APRN  10/04/21 1926

## 2021-10-05 ENCOUNTER — APPOINTMENT (OUTPATIENT)
Dept: ULTRASOUND IMAGING | Facility: HOSPITAL | Age: 76
End: 2021-10-05

## 2021-10-05 LAB
ANION GAP SERPL CALCULATED.3IONS-SCNC: 11 MMOL/L (ref 5–15)
BUN SERPL-MCNC: 29 MG/DL (ref 8–23)
BUN/CREAT SERPL: 26.6 (ref 7–25)
CALCIUM SPEC-SCNC: 8.4 MG/DL (ref 8.6–10.5)
CHLORIDE SERPL-SCNC: 102 MMOL/L (ref 98–107)
CHOLEST SERPL-MCNC: 107 MG/DL (ref 0–200)
CO2 SERPL-SCNC: 24 MMOL/L (ref 22–29)
CREAT SERPL-MCNC: 1.09 MG/DL (ref 0.76–1.27)
GFR SERPL CREATININE-BSD FRML MDRD: 66 ML/MIN/1.73
GLUCOSE SERPL-MCNC: 78 MG/DL (ref 65–99)
HBA1C MFR BLD: 5.9 % (ref 4.8–5.6)
HDLC SERPL-MCNC: 30 MG/DL (ref 40–60)
LDLC SERPL CALC-MCNC: 58 MG/DL (ref 0–100)
LDLC/HDLC SERPL: 1.91 {RATIO}
POTASSIUM SERPL-SCNC: 4.3 MMOL/L (ref 3.5–5.2)
QT INTERVAL: 410 MS
QTC INTERVAL: 429 MS
SODIUM SERPL-SCNC: 137 MMOL/L (ref 136–145)
TRIGL SERPL-MCNC: 99 MG/DL (ref 0–150)
VLDLC SERPL-MCNC: 19 MG/DL (ref 5–40)

## 2021-10-05 PROCEDURE — 93971 EXTREMITY STUDY: CPT

## 2021-10-05 PROCEDURE — 96361 HYDRATE IV INFUSION ADD-ON: CPT

## 2021-10-05 PROCEDURE — 94640 AIRWAY INHALATION TREATMENT: CPT

## 2021-10-05 PROCEDURE — 97161 PT EVAL LOW COMPLEX 20 MIN: CPT | Performed by: PHYSICAL THERAPIST

## 2021-10-05 PROCEDURE — 94799 UNLISTED PULMONARY SVC/PX: CPT

## 2021-10-05 PROCEDURE — 76775 US EXAM ABDO BACK WALL LIM: CPT

## 2021-10-05 PROCEDURE — 36415 COLL VENOUS BLD VENIPUNCTURE: CPT | Performed by: NURSE PRACTITIONER

## 2021-10-05 PROCEDURE — 80061 LIPID PANEL: CPT | Performed by: NURSE PRACTITIONER

## 2021-10-05 PROCEDURE — 97166 OT EVAL MOD COMPLEX 45 MIN: CPT

## 2021-10-05 PROCEDURE — 80048 BASIC METABOLIC PNL TOTAL CA: CPT | Performed by: NURSE PRACTITIONER

## 2021-10-05 PROCEDURE — 96360 HYDRATION IV INFUSION INIT: CPT

## 2021-10-05 PROCEDURE — 93971 EXTREMITY STUDY: CPT | Performed by: SURGERY

## 2021-10-05 PROCEDURE — G0378 HOSPITAL OBSERVATION PER HR: HCPCS

## 2021-10-05 PROCEDURE — 25010000002 CEFTRIAXONE PER 250 MG: Performed by: NURSE PRACTITIONER

## 2021-10-05 PROCEDURE — 83036 HEMOGLOBIN GLYCOSYLATED A1C: CPT | Performed by: NURSE PRACTITIONER

## 2021-10-05 RX ORDER — POLYETHYLENE GLYCOL 3350 17 G/17G
17 POWDER, FOR SOLUTION ORAL DAILY
Status: DISCONTINUED | OUTPATIENT
Start: 2021-10-05 | End: 2021-10-06 | Stop reason: HOSPADM

## 2021-10-05 RX ADMIN — BUDESONIDE INHALATION 0.5 MG: 0.5 SUSPENSION RESPIRATORY (INHALATION) at 21:47

## 2021-10-05 RX ADMIN — POLYETHYLENE GLYCOL 3350 17 G: 17 POWDER, FOR SOLUTION ORAL at 18:00

## 2021-10-05 RX ADMIN — LEVOTHYROXINE SODIUM 100 MCG: 100 TABLET ORAL at 05:57

## 2021-10-05 RX ADMIN — SODIUM CHLORIDE 1 G: 9 INJECTION, SOLUTION INTRAVENOUS at 18:00

## 2021-10-05 RX ADMIN — BUDESONIDE INHALATION 0.5 MG: 0.5 SUSPENSION RESPIRATORY (INHALATION) at 07:01

## 2021-10-05 RX ADMIN — AMLODIPINE BESYLATE 10 MG: 10 TABLET ORAL at 09:00

## 2021-10-05 RX ADMIN — GLYCERIN 2 DROP: .002; .002; .01 SOLUTION/ DROPS OPHTHALMIC at 08:57

## 2021-10-05 RX ADMIN — GLYCERIN 2 DROP: .002; .002; .01 SOLUTION/ DROPS OPHTHALMIC at 22:01

## 2021-10-05 RX ADMIN — GLYCERIN 2 DROP: .002; .002; .01 SOLUTION/ DROPS OPHTHALMIC at 04:25

## 2021-10-05 RX ADMIN — PROPRANOLOL HYDROCHLORIDE 10 MG: 10 TABLET ORAL at 09:00

## 2021-10-05 RX ADMIN — GLYCERIN 2 DROP: .002; .002; .01 SOLUTION/ DROPS OPHTHALMIC at 00:58

## 2021-10-05 RX ADMIN — SODIUM CHLORIDE, POTASSIUM CHLORIDE, SODIUM LACTATE AND CALCIUM CHLORIDE 75 ML/HR: 600; 310; 30; 20 INJECTION, SOLUTION INTRAVENOUS at 15:54

## 2021-10-05 RX ADMIN — LISINOPRIL 10 MG: 10 TABLET ORAL at 09:00

## 2021-10-05 NOTE — THERAPY EVALUATION
Patient Name: Angel Hernandez  : 1945    MRN: [id number]                              Today's Date: 10/5/2021       Admit Date: 10/4/2021    Visit Dx:     ICD-10-CM ICD-9-CM   1. Generalized weakness  R53.1 780.79   2. Acute UTI  N39.0 599.0   3. Multiple falls  R29.6 V15.88   4. History of chronic obstructive pulmonary disease  Z87.09 V12.69   5. Decreased functional activity tolerance  R68.89 780.99   6. Decreased activities of daily living (ADL)  Z78.9 V49.89     Patient Active Problem List   Diagnosis   • Generalized weakness   • Frequent falls   • Acute UTI (urinary tract infection)   • Adult failure to thrive   • Hypothyroidism (acquired)   • Exophthalmos     Past Medical History:   Diagnosis Date   • Asthma    • COPD (chronic obstructive pulmonary disease) (HCC)    • Disease of thyroid gland    • Hyperlipidemia    • Hypertension      Past Surgical History:   Procedure Laterality Date   • COLONOSCOPY  2011    unremarkable   • FRACTURE SURGERY      jaw   • WRIST SURGERY       General Information     Row Name 10/05/21 1003          OT Time and Intention    Document Type  evaluation Pt presents with frequent falls (8-10 over last 3 days), generalized weakness.  DX: failure to thrive, UTI  -CS     Mode of Treatment  occupational therapy;co-treatment  -CS     Row Name 10/05/21 1003          General Information    Patient Profile Reviewed  yes  -CS     Prior Level of Function  independent:;ADL's;all household mobility;dressing;bathing;driving;shopping;cooking Pt reports functionally he has declined over the last week due to fear of falling more  -CS     Existing Precautions/Restrictions  fall;oxygen therapy device and L/min  -CS     Barriers to Rehab  medically complex;physical barrier  -CS     Row Name 10/05/21 1003          Occupational Profile    Environmental Supports and Barriers (Occupational Profile)  walk in shower  -CS     Row Name 10/05/21 1003          Living Environment    Lives With  alone   -CS     Row Name 10/05/21 1003          Home Main Entrance    Number of Stairs, Main Entrance  two  -CS     Stair Railings, Main Entrance  railings on both sides of stairs  -CS     Row Name 10/05/21 1003          Stairs Within Home, Primary    Number of Stairs, Within Home, Primary  none  -CS     Row Name 10/05/21 1003          Cognition    Orientation Status (Cognition)  oriented x 4  -CS     Row Name 10/05/21 1003          Safety Issues, Functional Mobility    Impairments Affecting Function (Mobility)  endurance/activity tolerance;shortness of breath  -       User Key  (r) = Recorded By, (t) = Taken By, (c) = Cosigned By    Initials Name Provider Type    CS Miriam Woodard S, OTR/L, CNT Occupational Therapist          Mobility/ADL's     Row Name 10/05/21 1003          Bed Mobility    Bed Mobility  supine-sit  -CS     Supine-Sit [place] (Bed Mobility)  modified independence  -     Assistive Device (Bed Mobility)  head of bed elevated;bed rails  -     Row Name 10/05/21 1003          Transfers    Sit-Stand [place] (Transfers)  modified independence  -     Row Name 10/05/21 1003          Functional Mobility    Functional Mobility- Ind. Level  contact guard assist;verbal cues required  -     Functional Mobility- Comment  Pt walked in hallway, required 1 rest break, and ambulated back to bedside chair  -     Row Name 10/05/21 1003          Activities of Daily Living    BADL Assessment/Intervention  lower body dressing;grooming  -     Row Name 10/05/21 1003          Lower Body Dressing Assessment/Training    [place] Level (Lower Body Dressing)  don;shoes/slippers;independent  -CS     Position (Lower Body Dressing)  edge of bed sitting  -CS     Comment (Lower Body Dressing)  slipped shoes on without reach  -CS     Row Name 10/05/21 1003          Grooming Assessment/Training    [place] Level (Grooming)  wash face, hands;supervision  -CS     Position (Grooming)  sink side  -CS     Comment  (Grooming)  standing sink side  -CS       User Key  (r) = Recorded By, (t) = Taken By, (c) = Cosigned By    Initials Name Provider Type    Miriam Booker OTR/L, JOE Occupational Therapist        Obj/Interventions     Row Name 10/05/21 1003          Sensory Assessment (Somatosensory)    Sensory Assessment (Somatosensory)  UE sensation intact  -     Row Name 10/05/21 1003          Vision Assessment/Intervention    Visual Impairment/Limitations  WFL  -CS     Row Name 10/05/21 1003          Range of Motion Comprehensive    General Range of Motion  no range of motion deficits identified  -     Row Name 10/05/21 1003          Strength Comprehensive (MMT)    General Manual Muscle Testing (MMT) Assessment  no strength deficits identified  -     Row Name 10/05/21 1003          Balance    Balance Assessment  sitting static balance;sitting dynamic balance;standing static balance;standing dynamic balance  -CS     Static Sitting Balance  WFL  -CS     Dynamic Sitting Balance  WFL  -CS     Static Standing Balance  WFL  -CS     Dynamic Standing Balance  WFL  -CS       User Key  (r) = Recorded By, (t) = Taken By, (c) = Cosigned By    Initials Name Provider Type    Miriam Booker, OTR/L, JOE Occupational Therapist        Goals/Plan     Row Name 10/05/21 1134          Bathing Goal 1 (OT)    Activity/Device (Bathing Goal 1, OT)  bathing skills, all  -CS     [place] Level/Cues Needed (Bathing Goal 1, OT)  independent  -CS     Time Frame (Bathing Goal 1, OT)  long term goal (LTG)  -CS     Strategies/Barriers (Bathing Goal 1, OT)  with no vcs for energy conservation techniques  -CS     Progress/Outcomes (Bathing Goal 1, OT)  goal ongoing  -CS     Row Name 10/05/21 1134          Dressing Goal 1 (OT)    Activity/Device (Dressing Goal 1, OT)  dressing skills, all  -CS     [place]/Cues Needed (Dressing Goal 1, OT)  independent  -CS     Time Frame (Dressing Goal 1, OT)  long term goal (LTG)  -CS     Progress/Outcome  (Dressing Goal 1, OT)  goal ongoing  -CS     Row Name 10/05/21 1134          Grooming Goal 1 (OT)    Activity/Device (Grooming Goal 1, OT)  grooming skills, all  -CS     [place] (Grooming Goal 1, OT)  independent  -CS     Time Frame (Grooming Goal 1, OT)  long term goal (LTG)  -CS     Strategies/Barriers (Grooming Goal 1, OT)  with no vcs for initiation of energy conservation techniques  -CS     Progress/Outcome (Grooming Goal 1, OT)  goal ongoing  -CS     Row Name 10/05/21 1134          Therapy Assessment/Plan (OT)    Planned Therapy Interventions (OT)  activity tolerance training;BADL retraining;functional balance retraining;occupation/activity based interventions;patient/caregiver education/training;transfer/mobility retraining;strengthening exercise  -CS       User Key  (r) = Recorded By, (t) = Taken By, (c) = Cosigned By    Initials Name Provider Type    CS Miriam Woodard, OTR/L, CNT Occupational Therapist        Clinical Impression     Row Name 10/05/21 1003          Pain Assessment    Additional Documentation  Pain Scale: Numbers Pre/Post-Treatment (Group) fatigue  -CS     Row Name 10/05/21 1003          Pain Scale: Numbers Pre/Post-Treatment    Pretreatment Pain Rating  0/10 - no pain  -CS     Posttreatment Pain Rating  0/10 - no pain  -CS     Row Name 10/05/21 1003          Plan of Care Review    Plan of Care Reviewed With  patient  -CS     Progress  no change  -CS     Outcome Summary  OT evaluation completed.  Pt demo need for cont OT services due to need for edu regarding energy conservation, and endurance and generalized strength training.  Pt reports he has progressively had to stop doing daily self care tasks over the last week and he has fallen 8-10x over last 3 days due to his weakness and SOA.  Pt completed bed mobility and functional transfers with mod I.  He completed functional mobility in the hallway with CGA requiring a standing rest break due to fatigue.  Pt fatigues quickly and on 4L  O2 he maintains an O2 sat with activity at 91%.  He completed grooming tasks with SBA at sink side however he required standing rest breaks to complete task.  OT will cont to follow however it is recommended for pt to discharge home with  OT and PT.  -CS     Row Name 10/05/21 1003          Therapy Assessment/Plan (OT)    Patient/Family Therapy Goal Statement (OT)  to go home  -CS     Rehab Potential (OT)  good, to achieve stated therapy goals  -CS     Criteria for Skilled Therapeutic Interventions Met (OT)  yes;skilled treatment is necessary  -CS     Therapy Frequency (OT)  5 times/wk  -CS     Row Name 10/05/21 1003          Therapy Plan Review/Discharge Plan (OT)    Anticipated Discharge Disposition (OT)  home with home health  -CS     Row Name 10/05/21 1003          Vital Signs    Pre SpO2 (%)  98  -CS     O2 Delivery Pre Treatment  supplemental O2 3L  -CS     Intra SpO2 (%)  91  -CS     O2 Delivery Intra Treatment  supplemental O2 4L during activity  -CS     Post SpO2 (%)  95  -CS     O2 Delivery Post Treatment  supplemental O2 3L  -CS     Pre Patient Position  Supine  -CS     Intra Patient Position  Standing  -CS     Post Patient Position  Sitting  -CS     Row Name 10/05/21 1003          Positioning and Restraints    Pre-Treatment Position  in bed  -CS     Post Treatment Position  chair  -CS     In Chair  sitting;call light within reach;encouraged to call for assist  -CS       User Key  (r) = Recorded By, (t) = Taken By, (c) = Cosigned By    Initials Name Provider Type    CS Miriam Woodard, OTR/L, CNT Occupational Therapist        Outcome Measures     Row Name 10/05/21 1003          How much help from another is currently needed...    Putting on and taking off regular lower body clothing?  3  -CS     Bathing (including washing, rinsing, and drying)  3  -CS     Toileting (which includes using toilet bed pan or urinal)  3  -CS     Putting on and taking off regular upper body clothing  4  -CS     Taking care of  personal grooming (such as brushing teeth)  3  -CS     Eating meals  4  -CS     AM-PAC 6 Clicks Score (OT)  20  -CS     Row Name 10/05/21 1122          How much help from another person do you currently need...    Turning from your back to your side while in flat bed without using bedrails?  3  -MS     Moving from lying on back to sitting on the side of a flat bed without bedrails?  3  -MS     Moving to and from a bed to a chair (including a wheelchair)?  4  -MS     Standing up from a chair using your arms (e.g., wheelchair, bedside chair)?  3  -MS     Climbing 3-5 steps with a railing?  3  -MS     To walk in hospital room?  3  -MS     AM-PAC 6 Clicks Score (PT)  19  -MS     Row Name 10/05/21 1122 10/05/21 1003       Functional Assessment    Outcome Measure Options  AM-PAC 6 Clicks Basic Mobility (PT)  -MS  AM-PAC 6 Clicks Daily Activity (OT)  -CS      User Key  (r) = Recorded By, (t) = Taken By, (c) = Cosigned By    Initials Name Provider Type    Gina Parkinson R, PT, DPT, NCS Physical Therapist    CS Miriam Woodard, OTR/L, CNT Occupational Therapist          Occupational Therapy Education                 Title: PT OT SLP Therapies (In Progress)     Topic: Occupational Therapy (In Progress)     Point: ADL training (In Progress)     Description:   Instruct learner(s) on proper safety adaptation and remediation techniques during self care or transfers.   Instruct in proper use of assistive devices.              Learning Progress Summary           Patient Acceptance, E, NR by CS at 10/5/2021 1137                   Point: Home exercise program (In Progress)     Description:   Instruct learner(s) on appropriate technique for monitoring, assisting and/or progressing therapeutic exercises/activities.              Learning Progress Summary           Patient Acceptance, E, NR by CS at 10/5/2021 1137                   Point: Precautions (In Progress)     Description:   Instruct learner(s) on prescribed precautions  during self-care and functional transfers.              Learning Progress Summary           Patient Acceptance, E, NR by  at 10/5/2021 1137                   Point: Body mechanics (In Progress)     Description:   Instruct learner(s) on proper positioning and spine alignment during self-care, functional mobility activities and/or exercises.              Learning Progress Summary           Patient Acceptance, E, NR by  at 10/5/2021 1137                               User Key     Initials Effective Dates Name Provider Type Discipline     06/16/21 -  Miriam Woodard, OTR/L, CNT Occupational Therapist OT              OT Recommendation and Plan  Planned Therapy Interventions (OT): activity tolerance training, BADL retraining, functional balance retraining, occupation/activity based interventions, patient/caregiver education/training, transfer/mobility retraining, strengthening exercise  Therapy Frequency (OT): 5 times/wk  Plan of Care Review  Plan of Care Reviewed With: patient  Progress: no change  Outcome Summary: OT evaluation completed.  Pt demo need for cont OT services due to need for edu regarding energy conservation, and endurance and generalized strength training.  Pt reports he has progressively had to stop doing daily self care tasks over the last week and he has fallen 8-10x over last 3 days due to his weakness and SOA.  Pt completed bed mobility and functional transfers with mod I.  He completed functional mobility in the hallway with CGA requiring a standing rest break due to fatigue.  Pt fatigues quickly and on 4L O2 he maintains an O2 sat with activity at 91%.  He completed grooming tasks with SBA at sink side however he required standing rest breaks to complete task.  OT will cont to follow however it is recommended for pt to discharge home with  OT and PT.     Time Calculation:   Time Calculation- OT     Row Name 10/05/21 1136             Time Calculation- OT    OT Start Time  1003  -      OT  Stop Time  1046  -CS      OT Time Calculation (min)  43 min  -CS      OT Received On  10/05/21  -CS      OT Goal Re-Cert Due Date  10/15/21  -CS         Untimed Charges    OT Eval/Re-eval Minutes  43  -CS         Total Minutes    Untimed Charges Total Minutes  43  -CS       Total Minutes  43  -CS        User Key  (r) = Recorded By, (t) = Taken By, (c) = Cosigned By    Initials Name Provider Type    CS Miriam Woodard OTR/L, JOE Occupational Therapist        Therapy Charges for Today     Code Description Service Date Service Provider Modifiers Qty    74303289876 HC OT EVAL MOD COMPLEXITY 3 10/5/2021 Miriam Woodard OTR/L, JOE GO 1               JIMMY Perez/L, CNT  10/5/2021

## 2021-10-05 NOTE — PROGRESS NOTES
Malnutrition Severity Assessment    Patient Name:  Angel Hernandez  YOB: 1945  MRN: [id number]  Admit Date:  10/4/2021    Patient meets criteria for : Moderate (non-severe) Malnutrition        Malnutrition Severity Assessment  Malnutrition Type: Chronic Disease - Related Malnutrition     Malnutrition Type (last 8 hours)      Malnutrition Severity Assessment     Row Name 10/05/21 1521       Malnutrition Severity Assessment    Malnutrition Type  Chronic Disease - Related Malnutrition    Row Name 10/05/21 1521       Insufficient Energy Intake     Insufficient Energy Intake Findings  Moderate    Insufficient Energy Intake   <75% of est. energy requirement for > or equal to 1 month    Row Name 10/05/21 1521       Muscle Loss    Loss of Muscle Mass Findings  Moderate    Clavicle Bone Region  Moderate - some protrusion in females, visible in males    Acromion Bone Region  Moderate - acromion may slightly protrude    Dorsal Hand Region  Moderate - slight depression    Row Name 10/05/21 1521       Fat Loss    Subcutaneous Fat Loss Findings  Moderate    Upper Arm Region  Moderate - some fat tissue, not ample    Thoracic & Lumbar Region  Moderate - ribs visible with mild depressions, iliac crest somewhat prominent    Row Name 10/05/21 1521       Declining Functional Status    Declining Functional Status Findings  Measurably Reduced    Row Name 10/05/21 1521       Criteria Met (Must meet criteria for severity in at least 2 of these categories: M Wasting, Fat Loss, Fluid, Secondary Signs, Wt. Status, Intake)    Patient meets criteria for   Moderate (non-severe) Malnutrition          Electronically signed by:  Jolynn Jimenez RDN, LD  10/05/21 15:22 CDT

## 2021-10-05 NOTE — PLAN OF CARE
Goal Outcome Evaluation:  Plan of Care Reviewed With: patient        Progress: no change  Outcome Summary: OT evaluation completed.  Pt demo need for cont OT services due to need for edu regarding energy conservation, and endurance and generalized strength training.  Pt reports he has progressively had to stop doing daily self care tasks over the last week and he has fallen 8-10x over last 3 days due to his weakness and SOA.  Pt completed bed mobility and functional transfers with mod I.  He completed functional mobility in the hallway with CGA requiring a standing rest break due to fatigue.  Pt fatigues quickly and on 4L O2 he maintains an O2 sat with activity at 91%.  He completed grooming tasks with SBA at sink side however he required standing rest breaks to complete task.  OT will cont to follow however it is recommended for pt to discharge home with HH OT and PT.

## 2021-10-05 NOTE — PLAN OF CARE
Goal Outcome Evaluation:  Plan of Care Reviewed With: patient        Progress: no change  Outcome Summary: PT evaluation completed. The patient presents alert and oriented x4. He lives alone and care for himself. He was having difficulty with fulling caring for himself over the past week due to SOA. He demonstrates no focal strength deficits, but does fatigue quickly with minimal activity. He becomes SOA during gait or standing activities, but his O2 sat stays in the low 90's while on 4L. He can walk about 20ft at a time and requires a rest break due to SOA. He will benefit from continued PT to work on activity tolerance so he can continue to care for himself. Recommend discharge home with home health.

## 2021-10-05 NOTE — PAYOR COMM NOTE
"Angel Hernandez (76 y.o. Male) [id number]  OBS Admit 10/4   [facility]  billie phone    Fax        Date of Birth Social Security Number Address Home Phone MRN    1945  [id number] [phone] [id number]    Mandaeism Marital Status          None        Admission Date Admission Type Admitting Provider Attending Provider Department, Room/Bed    10/4/21 Emergency Yair Zamora MD Moore, Jonathan Scott, MD [facility] 3C, 384/1    Discharge Date Discharge Disposition Discharge Destination                       Attending Provider: Yair Zamora MD    Allergies: No Known Allergies    Isolation: None   Infection: None   Code Status: CPR    Ht: 172.7 cm (68\")   Wt: 85.7 kg (189 lb)    Admission Cmt: None   Principal Problem: Acute UTI (urinary tract infection) [N39.0]                 Active Insurance as of 10/4/2021     Primary Coverage     Payor Plan Insurance Group Employer/Plan Group    [employer] [place] DEPT [id number]      Payor Plan Address Payor Plan Phone Number Payor Plan Fax Number Effective Dates    [facility] OFFICE OF COMMUNITY CARE [phone]  10/4/2021 - None Entered    [address] [address]       [facility] [address]       Subscriber Name Subscriber Birth Date Member ID       ANGEL HERNANDEZ 1945 [id number]                 Emergency Contacts          No emergency contacts on file.               History & Physical      Peggy Glover APRN at 10/04/21 1834              [facility] Medicine Services  HISTORY AND PHYSICAL    Date of Admission: 10/4/2021  Primary Care Physician: Ty Beal APRN    Subjective     Chief Complaint: Frequent falls and weakness    History of Present Illness  Angel Hernandez is a 76-year-old male with a past medical history of COPD, chronic respiratory failure on 3-4 L nasal cannula continuously, hypothyroidism, hyperlipidemia, " "hypertension.  Patient states he underwent cystoscopy per Dr. Medina at [facility] 7 days ago.  He presented to [facility] emergency department via EMS with complaints of 8-10 falls over the past 3 days.  He is reporting extreme weakness.  He is complaining of soreness in his shoulders and knees.  He has signs of injury on his upper extremities with healing areas of skin tears and bruising.  Patient states \"my brain scattered\".  Patient is extremely poor historian.  He is followed by the local [facility].  ER work-up revealed PO2 of 1.5 L nasal cannula of 68.  GFR 58, sodium 133, a significant UTI.  Elevated D-dimer-CT of the chest positive for cholelithiasis, emphysema and COPD, negative PE, incidental finding 2 cm nodule on the left renal contour.  Patient has no other complaints.  He is admitted for further evaluation treatment.    Review of Systems   A 10 point review of systems was completed, all negative except for those discussed in HPI    Past Medical History:   Past Medical History:   Diagnosis Date   • Asthma    • COPD (chronic obstructive pulmonary disease) (HCC)    • Disease of thyroid gland    • Hyperlipidemia    • Hypertension        Past Surgical History:   Past Surgical History:   Procedure Laterality Date   • COLONOSCOPY  2011    unremarkable   • FRACTURE SURGERY      jaw   • WRIST SURGERY         Family History: family history includes Arthritis in his father; No Known Problems in his mother.  Patient states he is unable to remember his mother's history.  The only thing he remember about his father is he had arthritis and  in a relatively young age and MVA.    Social History:  reports that he has quit smoking. He has never used smokeless tobacco. He reports previous alcohol use.    Code Status: Full, patient states he has no one to speak for him if unable to pick for himself      Allergies:  No Known Allergies    Medications:  Prior to Admission " "medications    Medication Sig Start Date End Date Taking? Authorizing Provider   amLODIPine (NORVASC) 10 MG tablet Take 10 mg by mouth Daily.    Geeta Martinez MD   ipratropium-albuterol (COMBIVENT RESPIMAT)  MCG/ACT inhaler Inhale 1 puff 4 (Four) Times a Day As Needed for Wheezing.    Geeta Martinez MD   LEVOTHYROXINE SODIUM PO Take  by mouth.    Geeta Martinez MD   lisinopril (PRINIVIL,ZESTRIL) 10 MG tablet Take 10 mg by mouth Daily.    Geeta Martinez MD   mometasone (ASMANEX TWISTHALER) inhaler 220 mcg/inhalation Inhale 2 puffs Daily.    Geeta Martinez MD   propranolol (INDERAL) 10 MG tablet Take 10 mg by mouth 2 (Two) Times a Day.    Geeta Martinez MD       Objective     /61 (Patient Position: Standing)   Pulse 70   Temp 97.8 °F (36.6 °C) (Oral)   Resp 21   Ht 172.7 cm (68\")   Wt 84.8 kg (187 lb)   SpO2 96%   BMI 28.43 kg/m²   Physical Exam  Vitals reviewed.   Constitutional:       Appearance: He is ill-appearing.   HENT:      Head: Normocephalic and atraumatic.      Mouth/Throat:      Mouth: Mucous membranes are moist.      Pharynx: Oropharynx is clear.      Comments: Edentulous  Eyes:      Extraocular Movements: Extraocular movements intact.      Conjunctiva/sclera:      Right eye: Right conjunctiva is injected.      Left eye: Left conjunctiva is injected.      Pupils: Pupils are equal, round, and reactive to light.      Comments: exophthalmus    Cardiovascular:      Rate and Rhythm: Normal rate and regular rhythm.   Pulmonary:      Effort: Pulmonary effort is normal.      Comments: Diminished throughout without adventitious breath sounds  Abdominal:      General: Bowel sounds are normal.      Palpations: Abdomen is soft.   Musculoskeletal:      Cervical back: Normal range of motion and neck supple.      Comments: Generalized weakness and debility   Skin:     General: Skin is warm and dry.      Findings: Bruising present.             Comments: " Multiple areas of bruising, skin tears noted left upper extremity   Neurological:      General: No focal deficit present.      Mental Status: He is alert and oriented to person, place, and time.      Comments: Extremely poor memory, poor historian   Psychiatric:         Mood and Affect: Mood normal.         Behavior: Behavior normal.       Pertinent Data:   Lab Results (last 72 hours)     Procedure Component Value Units Date/Time    Lactic Acid, Plasma [[id number]] Collected: 10/04/21 1901    Specimen: Blood Updated: 10/04/21 1909    Blood Culture - Blood, Arm, Left [[id number]] Collected: 10/04/21 1821    Specimen: Blood from Arm, Left Updated: 10/04/21 1836    COVID-19,[facility] IN-HOUSE,Nasal Swab No Transport Media 3-4 HR TAT - Swab, Nasal Cavity [[id number]]  (Normal) Collected: 10/04/21 1639    Specimen: Swab from Nasal Cavity Updated: 10/04/21 1734     COVID19 Not Detected    Urinalysis With Culture If Indicated - Urine, Clean Catch [[id number]]  (Abnormal) Collected: 10/04/21 1719    Specimen: Urine, Clean Catch Updated: 10/04/21 1729     Color, UA Dark Yellow     Appearance, UA Cloudy     pH, UA 5.5     Specific Gravity, UA 1.022     Glucose, UA Negative     Ketones, UA 15 mg/dL (1+)     Bilirubin, UA Negative     Blood, UA Large (3+)     Protein, UA >=300 mg/dL (3+)     Leuk Esterase, UA Moderate (2+)     Nitrite, UA Negative     Urobilinogen, UA 1.0 E.U./dL    Urinalysis, Microscopic Only - Urine, Clean Catch [[id number]]  (Abnormal) Collected: 10/04/21 1719    Specimen: Urine, Clean Catch Updated: 10/04/21 1729     RBC, UA 6-12 /HPF      WBC, UA Too Numerous to Count /HPF      Bacteria, UA 4+ /HPF      Squamous Epithelial Cells, UA 0-2 /HPF      Hyaline Casts, UA 0-2 /LPF      Methodology Automated Microscopy    Urine Culture - Urine, Urine, Clean Catch [[id number]] Collected: 10/04/21 1719    Specimen: Urine, Clean Catch Updated: 10/04/21 1729    TSH [[id number]]  (Normal) Collected: 10/04/21 1637    Specimen:  Blood Updated: 10/04/21 1716     TSH 2.530 uIU/mL     Comprehensive Metabolic Panel [[id number]]  (Abnormal) Collected: 10/04/21 1637    Specimen: Blood Updated: 10/04/21 1712     Glucose 127 mg/dL      BUN 33 mg/dL      Creatinine 1.22 mg/dL      Sodium 133 mmol/L      Potassium 4.2 mmol/L      Chloride 99 mmol/L      CO2 23.0 mmol/L      Calcium 9.0 mg/dL      Total Protein 7.5 g/dL      Albumin 3.40 g/dL      ALT (SGPT) 22 U/L      AST (SGOT) 37 U/L      Alkaline Phosphatase 70 U/L      Total Bilirubin 0.5 mg/dL      eGFR Non African Amer 58 mL/min/1.73      Globulin 4.1 gm/dL      A/G Ratio 0.8 g/dL      BUN/Creatinine Ratio 27.0     Anion Gap 11.0 mmol/L     Troponin [[id number]]  (Normal) Collected: 10/04/21 1637    Specimen: Blood Updated: 10/04/21 1710     Troponin T <0.010 ng/mL     BNP [[id number]]  (Normal) Collected: 10/04/21 1637    Specimen: Blood Updated: 10/04/21 1709     proBNP 518.1 pg/mL     D-dimer, Quantitative [[id number]]  (Abnormal) Collected: 10/04/21 1637    Specimen: Blood Updated: 10/04/21 1709     D-Dimer, Quantitative 1.57 mg/L (FEU)     Protime-INR [[id number]]  (Abnormal) Collected: 10/04/21 1637    Specimen: Blood Updated: 10/04/21 1709     Protime 14.4 Seconds      INR 1.16    CBC Auto Differential [[id number]]  (Abnormal) Collected: 10/04/21 1637    Specimen: Blood Updated: 10/04/21 1651     WBC 9.73 10*3/mm3      RBC 5.09 10*6/mm3      Hemoglobin 14.2 g/dL      Hematocrit 43.3 %      MCV 85.1 fL      MCH 27.9 pg      MCHC 32.8 g/dL      RDW 13.3 %      RDW-SD 41.8 fl      MPV 10.0 fL      Platelets 235 10*3/mm3      Neutrophil % 71.5 %      Lymphocyte % 18.2 %      Monocyte % 9.1 %      Eosinophil % 0.2 %      Basophil % 0.4 %      Immature Grans % 0.6 %      Neutrophils, Absolute 6.95 10*3/mm3      Lymphocytes, Absolute 1.77 10*3/mm3      Monocytes, Absolute 0.89 10*3/mm3      Eosinophils, Absolute 0.02 10*3/mm3      Basophils, Absolute 0.04 10*3/mm3      Immature Grans, Absolute 0.06  10*3/mm3      nRBC 0.0 /100 WBC     Blood Gas, Arterial - [[id number]]  (Abnormal) Collected: 10/04/21 1640    Specimen: Arterial Blood Updated: 10/04/21 1648     Site Right Brachial     Quinten's Test N/A     pH, Arterial 7.439 pH units      pCO2, Arterial 37.8 mm Hg      pO2, Arterial 68.4 mm Hg      Comment: 84 Value below reference range        HCO3, Arterial 25.6 mmol/L      Base Excess, Arterial 1.5 mmol/L      O2 Saturation, Arterial 94.7 %      Temperature 37.0 C      Barometric Pressure for Blood Gas 749 mmHg      Modality Nasal Cannula     Flow Rate 1.5 lpm      Ventilator Mode NA     Collected by [id number]     Comment: Meter: [id number]     :  [id number]        pCO2, Temperature Corrected 37.8 mm Hg      pH, Temp Corrected 7.439 pH Units      pO2, Temperature Corrected 68.4 mm Hg         Imaging Results (Last 24 Hours)     Procedure Component Value Units Date/Time    CT Angiogram Chest [[id number]] Collected: 10/04/21 1818     Updated: 10/04/21 1827    Narrative:      CT ANGIOGRAM CHEST- 10/4/2021 5:49 PM CDT      HISTORY: generalized weakness/ dyspnea/ elvated dimer      COMPARISON: None.      DOSE LENGTH PRODUCT: 407 mGy cm. Automated exposure control was also  utilized to decrease patient radiation dose.     TECHNIQUE: Helical tomographic images of the chest were obtained after  the administration of intravenous contrast following angiogram protocol.  Additionally, 3D and multiplanar reformatted images were provided.        FINDINGS:    Pulmonary arteries: There is adequate enhancement of the pulmonary  arteries to evaluate for central and segmental pulmonary emboli. There  are no filling defects within the main, lobar, segmental or visualized  subsegmental pulmonary arteries. The pulmonary arteries are within  normal limits for size.      Aorta and great vessels: Vascular calcifications present aortic arch..  The great vessels are normal in appearance.     Visualized neck base: The imaged portion  of the base of the neck appears  unremarkable.      Lungs: Emphysematous changes noted in the pulmonary parenchyma.. The  trachea and bronchial tree are patent.      Heart: The heart is normal in size. Coronary calcifications are  present..      Mediastinum and lymph nodes: No enlarged mediastinal, hilar, or axillary  lymph nodes are present.      Skeletal and soft tissues: The osseous structures of the thorax and  surrounding soft tissues demonstrate no acute process.     Upper abdomen: Calculi are present in the gallbladder..        Impression:      1. No evidence of pulmonary embolus.        2. Emphysema and COPD.  3. Cholelithiasis  4. 2 cm nodule on the left renal contour is incompletely evaluated..        This report was finalized on 10/04/2021 18:24 by Dr. Austin Monzon MD.    CT Head Without Contrast [[id number]] Collected: 10/04/21 1815     Updated: 10/04/21 1820    Narrative:      EXAMINATION:   CT HEAD WO CONTRAST-  10/4/2021 6:15 PM CDT     HISTORY: Patient fell CT BRAIN without contrast 10/4/2021 5:49 PM CDT     HISTORY: Generalized weakness     COMPARISON: None      DLP: 700 mGy cm. In order to have a CT radiation dose as low as  reasonably achievable, Automated Exposure Control was utilized for  adjustment of the mA and/or KV according to patient size.     TECHNIQUE: Serial axial tomographic images of the brain were obtained  without the use of intravenous contrast.      FINDINGS:   The midline structures are nondisplaced. There is mild cerebral and  cerebellar volume loss, with an associated increase in the prominence of  the ventricles and sulci. The basilar cisterns are normal in size and  configuration. There is no evidence of intracranial hemorrhage or  mass-effect. There is low attenuation in the periventricular white  matter, consistent with chronic ischemic change. Old lacunar infarctions  noted right basal ganglia There are no abnormal extra-axial fluid  collections. There is no evidence of  tonsillar herniation.      Exophthalmus is noted.. The visualized paranasal sinuses, mastoid air  cells and middle ear cavities are clear. The visualized osseous  structures and overlying soft tissues of the skull and face are intact.        Impression:      Changes of aging with no acute intracranial abnormality..        2. Exophthalmos        This report was finalized on 10/04/2021 18:17 by Dr. Austin Monzon MD.    XR Chest 1 View [[id number]] Collected: 10/04/21 1629     Updated: 10/04/21 1633    Narrative:      EXAMINATION: XR CHEST 1 VW-     10/4/2021 4:26 PM CDT     HISTORY: dyspnea     1 view chest x-ray.     No comparison.     Heart size is normal.  The mediastinum is within normal limits.      The lungs are mildly hyperexpanded with no pneumonia or pneumothorax.  Mild chronic appearing interstitial disease with a few calcified  granulomas.  No congestive failure changes.                                                                       Impression:      1. No acute disease.        This report was finalized on 10/04/2021 16:30 by Dr. Harsh Jama MD.          Assessment / Plan     Assessment:   Active Hospital Problems    Diagnosis    • **Acute UTI (urinary tract infection)    • Generalized weakness    • Frequent falls    • Adult failure to thrive    • Hypothyroidism (acquired)    • Exophthalmos    Incidental finding-2 cm left renal nodule  UTI status post cystoscopy 9/28/2021    Plan:   1.  Admit as duration  2.  Continue Rocephin 1 g IV daily  3.  Gentle hydration LR at 75 mL/hour  4.  Home medications reviewed and restarted as appropriate, unsure patient's list is correct  5.  DVT prophylaxis with SCDs  6.  Official tears every 4 hours  7.  Supplemental oxygen, continuous pulse oximetry, ABGs as needed, incentive spirometry, duo nebs as needed for shortness of breathing or wheezing  8.  Limited renal ultrasound-left renal nodule, left lower extremity venous Doppler study in a.m.  9.  Labs in a.m.  10.   Consult PT and OT for strengthening  11.  Consult , patient may need home health    I discussed the patient's findings and my recommendations with: Yair Zamora MD  Time spent 40 minutes    Patient seen and examined by me on 10/4/2021 at 6:35 PM.    Electronically signed by Peggy CEJA. AYAKA Glover, 10/04/21, 19:26 CDT.    Electronically signed by Peggy Glover APRN at 10/04/21 1926          Emergency Department Notes      Elma Ervin APRN at 10/04/21 1555     Attestation signed by Peter Gonzales MD at 10/04/21 1956          For this patient encounter, I reviewed the NP or PA documentation, treatment plan, and medical decision making. Peter Gonzales MD 10/4/2021 19:56 CDT                  Subjective   Patient is a 76-year-old white male presents the emergency department with generalized weakness and increased shortness of breath over the past 4 days.  Patient states that he has fallen several times over the past 2 days due to generalized weakness.  He states today that he has been short of breath even though he has had his oxygen on all day.  He states he is so weak he can hardly stand.  He denies any chest pain.  He denies any fever or chills.  No nausea or vomiting.  No focal weakness      History provided by:  Patient   used: No        Review of Systems   Constitutional: Negative.    HENT: Negative.    Eyes: Negative.    Respiratory:        Patient is a 76-year-old white male presents the emergency department with generalized weakness and increased shortness of breath over the past 4 days.  Patient states that he has fallen several times over the past 2 days due to generalized weakness.  He states today that he has been short of breath even though he has had his oxygen on all day.  He states he is so weak he can hardly stand.  He denies any chest pain.  He denies any fever or chills.  No nausea or vomiting.  No focal weakness.  Patient is a  past smoker.  He states he stopped smoking about 2 years ago.  He does have a history of COPD and is oxygen dependent at home.     Cardiovascular: Negative.    Gastrointestinal: Negative.    Endocrine: Negative.    Genitourinary: Negative.    Musculoskeletal: Negative.    Skin: Negative.    Allergic/Immunologic: Negative.    Neurological: Negative.    Hematological: Negative.    Psychiatric/Behavioral: Negative.    All other systems reviewed and are negative.      Past Medical History:   Diagnosis Date   • Asthma    • COPD (chronic obstructive pulmonary disease) (HCC)    • Disease of thyroid gland    • Hyperlipidemia    • Hypertension        No Known Allergies    Past Surgical History:   Procedure Laterality Date   • COLONOSCOPY  05/09/2011    unremarkable   • FRACTURE SURGERY      jaw   • WRIST SURGERY         Family History   Problem Relation Age of Onset   • No Known Problems Mother    • Arthritis Father        Social History     Socioeconomic History   • Marital status:      Spouse name: Not on file   • Number of children: Not on file   • Years of education: Not on file   • Highest education level: Not on file   Tobacco Use   • Smoking status: Former Smoker   • Smokeless tobacco: Never Used   Substance and Sexual Activity   • Alcohol use: Not Currently   • Sexual activity: Defer       Prior to Admission medications    Medication Sig Start Date End Date Taking? Authorizing Provider   amLODIPine (NORVASC) 10 MG tablet Take 10 mg by mouth Daily.    Geeta Martinez MD   ipratropium-albuterol (COMBIVENT RESPIMAT)  MCG/ACT inhaler Inhale 1 puff 4 (Four) Times a Day As Needed for Wheezing.    Geeta Martinez MD   LEVOTHYROXINE SODIUM PO Take  by mouth.    Geeta Martinez MD   lisinopril (PRINIVIL,ZESTRIL) 10 MG tablet Take 10 mg by mouth Daily.    Geeta Martinez MD   mometasone (ASMANEX TWISTHALER) inhaler 220 mcg/inhalation Inhale 2 puffs Daily.    Geeta Martinez MD  "  propranolol (INDERAL) 10 MG tablet Take 10 mg by mouth 2 (Two) Times a Day.    Provider, MD Geeta       /61 (Patient Position: Standing)   Pulse 70   Temp 97.8 °F (36.6 °C) (Oral)   Resp 21   Ht 172.7 cm (68\")   Wt 84.8 kg (187 lb)   SpO2 96%   BMI 28.43 kg/m²     Objective   Physical Exam  Vitals and nursing note reviewed.   Constitutional:       Appearance: He is well-developed.      Comments: Chronically ill-appearing.  No acute distress.  Nontoxic-appearing   HENT:      Head: Normocephalic and atraumatic.      Comments: There is some ecchymosis noted to the right frontal forehead.  Eyes:      Conjunctiva/sclera: Conjunctivae normal.      Pupils: Pupils are equal, round, and reactive to light.      Comments: exophthalmus noted    Cardiovascular:      Rate and Rhythm: Normal rate and regular rhythm.      Heart sounds: Normal heart sounds.   Pulmonary:      Effort: Pulmonary effort is normal.      Comments: Expiratory wheezing noted throughout.  There is some diminished lung sounds bilateral bases  Abdominal:      General: Bowel sounds are normal.      Palpations: Abdomen is soft.   Musculoskeletal:         General: Normal range of motion.      Cervical back: Normal range of motion and neck supple.      Right lower leg: Edema present.      Left lower leg: Edema present.   Skin:     General: Skin is warm and dry.   Neurological:      Mental Status: He is alert and oriented to person, place, and time.      Deep Tendon Reflexes: Reflexes are normal and symmetric.   Psychiatric:         Behavior: Behavior normal.         Thought Content: Thought content normal.         Judgment: Judgment normal.         Procedures        Lab Results (last 24 hours)     Procedure Component Value Units Date/Time    Comprehensive Metabolic Panel [[id number]]  (Abnormal) Collected: 10/04/21 1637    Specimen: Blood Updated: 10/04/21 1712     Glucose 127 mg/dL      BUN 33 mg/dL      Creatinine 1.22 mg/dL      Sodium 133 " mmol/L      Potassium 4.2 mmol/L      Chloride 99 mmol/L      CO2 23.0 mmol/L      Calcium 9.0 mg/dL      Total Protein 7.5 g/dL      Albumin 3.40 g/dL      ALT (SGPT) 22 U/L      AST (SGOT) 37 U/L      Alkaline Phosphatase 70 U/L      Total Bilirubin 0.5 mg/dL      eGFR Non African Amer 58 mL/min/1.73      Globulin 4.1 gm/dL      A/G Ratio 0.8 g/dL      BUN/Creatinine Ratio 27.0     Anion Gap 11.0 mmol/L     Narrative:      GFR Normal >60  Chronic Kidney Disease <60  Kidney Failure <15      CBC & Differential [[id number]]  (Abnormal) Collected: 10/04/21 1637    Specimen: Blood Updated: 10/04/21 1651    Narrative:      The following orders were created for panel order CBC & Differential.  Procedure                               Abnormality         Status                     ---------                               -----------         ------                     CBC Auto Differential[[id number]]        Abnormal            Final result                 Please view results for these tests on the individual orders.    Protime-INR [[id number]]  (Abnormal) Collected: 10/04/21 1637    Specimen: Blood Updated: 10/04/21 1709     Protime 14.4 Seconds      INR 1.16    BNP [[id number]]  (Normal) Collected: 10/04/21 1637    Specimen: Blood Updated: 10/04/21 1709     proBNP 518.1 pg/mL     Narrative:      Among patients with dyspnea, NT-proBNP is highly sensitive for the detection of acute congestive heart failure. In addition NT-proBNP of <300 pg/ml effectively rules out acute congestive heart failure with 99% negative predictive value.    Results may be falsely decreased if patient taking Biotin.      Troponin [[id number]]  (Normal) Collected: 10/04/21 1637    Specimen: Blood Updated: 10/04/21 1710     Troponin T <0.010 ng/mL     Narrative:      Troponin T Reference Range:  <= 0.03 ng/mL-   Negative for AMI  >0.03 ng/mL-     Abnormal for myocardial necrosis.  Clinicians would have to utilize clinical acumen, EKG, Troponin and serial  changes to determine if it is an Acute Myocardial Infarction or myocardial injury due to an underlying chronic condition.       Results may be falsely decreased if patient taking Biotin.      D-dimer, Quantitative [[id number]]  (Abnormal) Collected: 10/04/21 1637    Specimen: Blood Updated: 10/04/21 1709     D-Dimer, Quantitative 1.57 mg/L (FEU)     Narrative:      Reference Range is 0-0.50 mg/L FEU. However, results <0.50 mg/L FEU tends to rule out DVT or PE. Results >0.50 mg/L FEU are not useful in predicting absence or presence of DVT or PE.      CBC Auto Differential [[id number]]  (Abnormal) Collected: 10/04/21 1637    Specimen: Blood Updated: 10/04/21 1651     WBC 9.73 10*3/mm3      RBC 5.09 10*6/mm3      Hemoglobin 14.2 g/dL      Hematocrit 43.3 %      MCV 85.1 fL      MCH 27.9 pg      MCHC 32.8 g/dL      RDW 13.3 %      RDW-SD 41.8 fl      MPV 10.0 fL      Platelets 235 10*3/mm3      Neutrophil % 71.5 %      Lymphocyte % 18.2 %      Monocyte % 9.1 %      Eosinophil % 0.2 %      Basophil % 0.4 %      Immature Grans % 0.6 %      Neutrophils, Absolute 6.95 10*3/mm3      Lymphocytes, Absolute 1.77 10*3/mm3      Monocytes, Absolute 0.89 10*3/mm3      Eosinophils, Absolute 0.02 10*3/mm3      Basophils, Absolute 0.04 10*3/mm3      Immature Grans, Absolute 0.06 10*3/mm3      nRBC 0.0 /100 WBC     TSH [[id number]]  (Normal) Collected: 10/04/21 1637    Specimen: Blood Updated: 10/04/21 1716     TSH 2.530 uIU/mL     Procalcitonin [[id number]] Collected: 10/04/21 1637    Specimen: Blood Updated: 10/04/21 1916    COVID PRE-OP / PRE-PROCEDURE SCREENING ORDER (NO ISOLATION) - Swab, Nasal Cavity [[id number]]  (Normal) Collected: 10/04/21 1639    Specimen: Swab from Nasal Cavity Updated: 10/04/21 9884    Narrative:      The following orders were created for panel order COVID PRE-OP / PRE-PROCEDURE SCREENING ORDER (NO ISOLATION) - Swab, Nasal Cavity.  Procedure                               Abnormality         Status                      ---------                               -----------         ------                     COVID-19,[facility]...[[id number]]  Normal              Final result                 Please view results for these tests on the individual orders.    COVID-19,[facility] IN-HOUSE,Nasal Swab No Transport Media 3-4 HR TAT - Swab, Nasal Cavity [[id number]]  (Normal) Collected: 10/04/21 1639    Specimen: Swab from Nasal Cavity Updated: 10/04/21 1734     COVID19 Not Detected    Narrative:      Fact sheet for providers: https://www.fda.gov/media/655369/download     Fact sheet for patients: https://www.[place].gov/media/395357/download    Test performed by PCR.    Consider negative results in combination with clinical observations, patient history, and epidemiological information.  Fact sheet for providers: https://www.fda.gov/media/062326/download     Fact sheet for patients: https://www.[place].gov/media/494601/download    Test performed by PCR.    Consider negative results in combination with clinical observations, patient history, and epidemiological information.    Blood Gas, Arterial - [[id number]]  (Abnormal) Collected: 10/04/21 1640    Specimen: Arterial Blood Updated: 10/04/21 1648     Site Right Brachial     Quinten's Test N/A     pH, Arterial 7.439 pH units      pCO2, Arterial 37.8 mm Hg      pO2, Arterial 68.4 mm Hg      Comment: 84 Value below reference range        HCO3, Arterial 25.6 mmol/L      Base Excess, Arterial 1.5 mmol/L      O2 Saturation, Arterial 94.7 %      Temperature 37.0 C      Barometric Pressure for Blood Gas 749 mmHg      Modality Nasal Cannula     Flow Rate 1.5 lpm      Ventilator Mode NA     Collected by [id number]     Comment: Meter: [id number]     :  [id number]        pCO2, Temperature Corrected 37.8 mm Hg      pH, Temp Corrected 7.439 pH Units      pO2, Temperature Corrected 68.4 mm Hg     Urinalysis With Culture If Indicated - Urine, Clean Catch [[id number]]  (Abnormal) Collected: 10/04/21 2209     Specimen: Urine, Clean Catch Updated: 10/04/21 1729     Color, UA Dark Yellow     Appearance, UA Cloudy     pH, UA 5.5     Specific Gravity, UA 1.022     Glucose, UA Negative     Ketones, UA 15 mg/dL (1+)     Bilirubin, UA Negative     Blood, UA Large (3+)     Protein, UA >=300 mg/dL (3+)     Leuk Esterase, UA Moderate (2+)     Nitrite, UA Negative     Urobilinogen, UA 1.0 E.U./dL    Urinalysis, Microscopic Only - Urine, Clean Catch [[id number]]  (Abnormal) Collected: 10/04/21 1719    Specimen: Urine, Clean Catch Updated: 10/04/21 1729     RBC, UA 6-12 /HPF      WBC, UA Too Numerous to Count /HPF      Bacteria, UA 4+ /HPF      Squamous Epithelial Cells, UA 0-2 /HPF      Hyaline Casts, UA 0-2 /LPF      Methodology Automated Microscopy    Urine Culture - Urine, Urine, Clean Catch [[id number]] Collected: 10/04/21 1719    Specimen: Urine, Clean Catch Updated: 10/04/21 1729    Blood Culture - Blood, Arm, Left [[id number]] Collected: 10/04/21 1821    Specimen: Blood from Arm, Left Updated: 10/04/21 1836    Lactic Acid, Plasma [[id number]] Collected: 10/04/21 1901    Specimen: Blood Updated: 10/04/21 1909          CT Head Without Contrast   Final Result   Changes of aging with no acute intracranial abnormality..           2. Exophthalmos           This report was finalized on 10/04/2021 18:17 by Dr. Austin Monzon MD.      CT Angiogram Chest   Final Result   1. No evidence of pulmonary embolus.           2. Emphysema and COPD.   3. Cholelithiasis   4. 2 cm nodule on the left renal contour is incompletely evaluated..           This report was finalized on 10/04/2021 18:24 by Dr. Austin Monzon MD.      XR Chest 1 View   Final Result   1. No acute disease.           This report was finalized on 10/04/2021 16:30 by Dr. Harsh Jama MD.      US Venous Doppler Lower Extremity Right (duplex)    (Results Pending)   US Renal Limited    (Results Pending)       ED Course  ED Course as of Oct 04 1926   Mon Oct 04, 2021   1829 Reviewed  pt and pt care plan with dr gonzales- also in agreement with pt care plan. Pt states that he feels very weak all over. Call placed to hospitalist at this time     [CW]   1839 Spoke with dr victor- has accepted for admission. Will be admitted soon in stable condition     [CW]      ED Course User Index  [CW] Elma Ervin APRN          MDM  Number of Diagnoses or Management Options  Acute UTI: new and requires workup  Generalized weakness: new and requires workup  History of chronic obstructive pulmonary disease: minor  Multiple falls: new and requires workup     Amount and/or Complexity of Data Reviewed  Clinical lab tests: ordered and reviewed  Tests in the radiology section of CPT®: ordered and reviewed    Patient Progress  Patient progress: stable      Final diagnoses:   Generalized weakness   Acute UTI   Multiple falls   History of chronic obstructive pulmonary disease          Elma Ervin APRN  10/04/21 1926      Electronically signed by Peter Gonzales MD at 10/04/21 1956         Current Facility-Administered Medications   Medication Dose Route Frequency Provider Last Rate Last Admin   • acetaminophen (TYLENOL) tablet 650 mg  650 mg Oral Q4H PRN Peggy Glover APRN        Or   • acetaminophen (TYLENOL) 160 MG/5ML solution 650 mg  650 mg Oral Q4H PRN Peggy Glover APRN        Or   • acetaminophen (TYLENOL) suppository 650 mg  650 mg Rectal Q4H PRN Peggy Glover APRN       • amLODIPine (NORVASC) tablet 10 mg  10 mg Oral Daily Peggy Glover, AYAKA       • budesonide (PULMICORT) nebulizer solution 0.5 mg  0.5 mg Nebulization BID - RT Peggy Glover APRN   0.5 mg at 10/05/21 0701   • cefTRIAXone (ROCEPHIN) 1 g in sodium chloride 0.9 % 100 mL IVPB-VTB  1 g Intravenous Q24H Peggy Glover APRN       • Glycerin-Hypromellose- (ARTIFICIAL TEARS) 0.2-0.2-1 % ophthalmic solution solution 2 drop  2 drop Both Eyes Q4H Peggy Glover APRN   2 drop at 10/05/21  0425   • influenza vac split quad (FLUZONE,FLUARIX,AFLURIA,FLULAVAL) injection 0.5 mL  0.5 mL Intramuscular During Hospitalization Yair Zamora MD       • ipratropium-albuterol (DUO-NEB) nebulizer solution 3 mL  3 mL Nebulization Q6H PRN Peggy Glover, APRN       • lactated ringers infusion  75 mL/hr Intravenous Continuous Peggy Glover APRN 75 mL/hr at 10/04/21 2228 75 mL/hr at 10/04/21 2228   • levothyroxine (SYNTHROID, LEVOTHROID) tablet 100 mcg  100 mcg Oral Q AM Peggy Glover APRN   100 mcg at 10/05/21 0557   • lisinopril (PRINIVIL,ZESTRIL) tablet 10 mg  10 mg Oral Daily Peggy Glover, APRN       • ondansetron (ZOFRAN) tablet 4 mg  4 mg Oral Q6H PRN Peggy Glover APRN        Or   • ondansetron (ZOFRAN) injection 4 mg  4 mg Intravenous Q6H PRN Peggy Glover, APRJESUS       • propranolol (INDERAL) tablet 10 mg  10 mg Oral Q12H Peggy Glover APRN   10 mg at 10/04/21 2228   • sodium chloride 0.9 % flush 10 mL  10 mL Intravenous PRN Elma Ervin, AYAKA       • sodium chloride 0.9 % flush 10 mL  10 mL Intravenous Q12H Peggy Glover APRN   10 mL at 10/04/21 2228   • sodium chloride 0.9 % flush 10 mL  10 mL Intravenous PRN Peggy Glover APRN

## 2021-10-05 NOTE — PLAN OF CARE
Goal Outcome Evaluation:  Plan of Care Reviewed With: patient        Progress: no change  Outcome Summary: Initial RDN eval. Pt reports good appetite however admits to very sporadic eating pattern at home. He doesn't have help with getting groceries or cooking, suspect limited intake. He does have muscle loss and fat loss, malnutrition assessment completed. Boost Plus BID. Will continue to follow.

## 2021-10-05 NOTE — PLAN OF CARE
Goal Outcome Evaluation:           Progress: improving  Outcome Summary: Voiding per urinal. IVF. IV abx. O2 @ 3L via NC; cont pulse ox. BUN and creatinine improving today. Safety maintained.

## 2021-10-05 NOTE — PROGRESS NOTES
[facility] Medicine Services  INPATIENT PROGRESS NOTE    Patient Name: Angel Hernandez  Date of Admission: 10/4/2021  Today's Date: 10/05/21  Length of Stay: 0  Primary Care Physician: Ty Beal APRN    Subjective   Chief Complaint: weakness  HPI   He was lying in bed resting comfortably.  He is on home use 3 L nasal cannula.  Denies shortness of breath, chest pain or pressure.  Afebrile.  Denies nausea, vomiting or abdominal pain.  States he did have occasional chills at home and dysuria.  He does complain of significant weakness in which she has had several falls over the last week.  He denies any pain related to recent falls.  States that he does live home alone.    Review of Systems   All pertinent negatives and positives are as above. All other systems have been reviewed and are negative unless otherwise stated.     Objective    Temp:  [97.8 °F (36.6 °C)-98.3 °F (36.8 °C)] 98.3 °F (36.8 °C)  Heart Rate:  [51-74] 54  Resp:  [14-21] 16  BP: (108-157)/(61-83) 136/74  Physical Exam  Vitals reviewed.   Constitutional:       General: He is not in acute distress.     Appearance: He is not toxic-appearing.      Interventions: Nasal cannula in place.      Comments: Chronically ill-appearing.  No acute distress.  On home use 3 L nasal cannula.  No family at bedside.   HENT:      Head: Normocephalic and atraumatic.      Mouth/Throat:      Mouth: Mucous membranes are moist.      Pharynx: Oropharynx is clear.   Eyes:      Extraocular Movements: Extraocular movements intact.      Conjunctiva/sclera: Conjunctivae normal.      Pupils: Pupils are equal, round, and reactive to light.   Cardiovascular:      Rate and Rhythm: Normal rate and regular rhythm.      Pulses: Normal pulses.   Pulmonary:      Effort: Pulmonary effort is normal. No respiratory distress.      Breath sounds: Normal breath sounds. No wheezing.   Abdominal:      General: Bowel sounds are normal. There is no  distension.      Palpations: Abdomen is soft.      Tenderness: There is no abdominal tenderness.   Musculoskeletal:         General: No swelling or tenderness. Normal range of motion.      Cervical back: Normal range of motion and neck supple. No muscular tenderness.   Skin:     General: Skin is warm and dry.      Findings: No erythema or rash.   Neurological:      General: No focal deficit present.      Mental Status: He is alert and oriented to person, place, and time.      Cranial Nerves: No cranial nerve deficit.      Motor: No weakness.   Psychiatric:         Mood and Affect: Mood normal.         Behavior: Behavior normal.       Results Review:  I have reviewed the labs, radiology results, and diagnostic studies.    Laboratory Data:   Results from last 7 days   Lab Units 10/04/21  1637   WBC 10*3/mm3 9.73   HEMOGLOBIN g/dL 14.2   HEMATOCRIT % 43.3   PLATELETS 10*3/mm3 235        Results from last 7 days   Lab Units 10/05/21  0543 10/04/21  1637   SODIUM mmol/L 137 133*   POTASSIUM mmol/L 4.3 4.2   CHLORIDE mmol/L 102 99   CO2 mmol/L 24.0 23.0   BUN mg/dL 29* 33*   CREATININE mg/dL 1.09 1.22   CALCIUM mg/dL 8.4* 9.0   BILIRUBIN mg/dL  --  0.5   ALK PHOS U/L  --  70   ALT (SGPT) U/L  --  22   AST (SGOT) U/L  --  37   GLUCOSE mg/dL 78 127*       Radiology Data:   Imaging Results (Last 24 Hours)     Procedure Component Value Units Date/Time    CT Angiogram Chest [[id number]] Collected: 10/04/21 1818     Updated: 10/04/21 1827    Narrative:      CT ANGIOGRAM CHEST- 10/4/2021 5:49 PM CDT      HISTORY: generalized weakness/ dyspnea/ elvated dimer      COMPARISON: None.      DOSE LENGTH PRODUCT: 407 mGy cm. Automated exposure control was also  utilized to decrease patient radiation dose.     TECHNIQUE: Helical tomographic images of the chest were obtained after  the administration of intravenous contrast following angiogram protocol.  Additionally, 3D and multiplanar reformatted images were provided.        FINDINGS:     Pulmonary arteries: There is adequate enhancement of the pulmonary  arteries to evaluate for central and segmental pulmonary emboli. There  are no filling defects within the main, lobar, segmental or visualized  subsegmental pulmonary arteries. The pulmonary arteries are within  normal limits for size.      Aorta and great vessels: Vascular calcifications present aortic arch..  The great vessels are normal in appearance.     Visualized neck base: The imaged portion of the base of the neck appears  unremarkable.      Lungs: Emphysematous changes noted in the pulmonary parenchyma.. The  trachea and bronchial tree are patent.      Heart: The heart is normal in size. Coronary calcifications are  present..      Mediastinum and lymph nodes: No enlarged mediastinal, hilar, or axillary  lymph nodes are present.      Skeletal and soft tissues: The osseous structures of the thorax and  surrounding soft tissues demonstrate no acute process.     Upper abdomen: Calculi are present in the gallbladder..        Impression:      1. No evidence of pulmonary embolus.        2. Emphysema and COPD.  3. Cholelithiasis  4. 2 cm nodule on the left renal contour is incompletely evaluated..        This report was finalized on 10/04/2021 18:24 by Dr. Austin Monzon MD.    CT Head Without Contrast [[id number]] Collected: 10/04/21 1815     Updated: 10/04/21 1820    Narrative:      EXAMINATION:   CT HEAD WO CONTRAST-  10/4/2021 6:15 PM CDT     HISTORY: Patient fell CT BRAIN without contrast 10/4/2021 5:49 PM CDT     HISTORY: Generalized weakness     COMPARISON: None      DLP: 700 mGy cm. In order to have a CT radiation dose as low as  reasonably achievable, Automated Exposure Control was utilized for  adjustment of the mA and/or KV according to patient size.     TECHNIQUE: Serial axial tomographic images of the brain were obtained  without the use of intravenous contrast.      FINDINGS:   The midline structures are nondisplaced. There is mild  cerebral and  cerebellar volume loss, with an associated increase in the prominence of  the ventricles and sulci. The basilar cisterns are normal in size and  configuration. There is no evidence of intracranial hemorrhage or  mass-effect. There is low attenuation in the periventricular white  matter, consistent with chronic ischemic change. Old lacunar infarctions  noted right basal ganglia There are no abnormal extra-axial fluid  collections. There is no evidence of tonsillar herniation.      Exophthalmus is noted.. The visualized paranasal sinuses, mastoid air  cells and middle ear cavities are clear. The visualized osseous  structures and overlying soft tissues of the skull and face are intact.        Impression:      Changes of aging with no acute intracranial abnormality..        2. Exophthalmos        This report was finalized on 10/04/2021 18:17 by Dr. Austin Monzon MD.    XR Chest 1 View [[id number]] Collected: 10/04/21 1629     Updated: 10/04/21 1633    Narrative:      EXAMINATION: XR CHEST 1 VW-     10/4/2021 4:26 PM CDT     HISTORY: dyspnea     1 view chest x-ray.     No comparison.     Heart size is normal.  The mediastinum is within normal limits.      The lungs are mildly hyperexpanded with no pneumonia or pneumothorax.  Mild chronic appearing interstitial disease with a few calcified  granulomas.  No congestive failure changes.                                                                       Impression:      1. No acute disease.        This report was finalized on 10/04/2021 16:30 by Dr. Harsh Jama MD.          I have reviewed the patient's current medications.     Assessment/Plan     Active Hospital Problems    Diagnosis    • **Acute UTI (urinary tract infection)    • Generalized weakness    • Frequent falls    • Adult failure to thrive    • Hypothyroidism (acquired)    • Exophthalmos      Plan:  1.  Patient presented on 10/4 with frequent falls and weakness.  He was found to have significant  urinary tract infection in the emergency department.  CT of the head negative for acute intracranial normality.  CT angiogram of the chest showed emphysema and COPD.    2.  Urine culture preliminary result greater than 100 K gram-negative bacilli.  Continue ceftriaxone.  Blood cultures in process.    3.  Renal ultrasound for left renal nodule seen on CT.    4.  Sequential compression devices for DVT prophylaxis.    5.  Consult physical and Occupational Therapy.    Discharge Planning: I expect the patient to be discharged to home with home health versus placement pending PT/OT evaluation.    Electronically signed by AYAKA Mancia, 10/05/21, 10:53 CDT.

## 2021-10-05 NOTE — CASE MANAGEMENT/SOCIAL WORK
Discharge Planning Assessment   Christiano     Patient Name: Angel Hernandez  MRN: [id number]  Today's Date: 10/5/2021    Admit Date: 10/4/2021    Discharge Needs Assessment     Row Name 10/05/21 1024       Living Environment    Lives With  alone    Current Living Arrangements  home/apartment/condo    Primary Care Provided by  self    Provides Primary Care For  no one       Resource/Environmental Concerns    Resource/Environmental Concerns  none       Transition Planning    Patient/Family Anticipates Transition to  home;home with help/services    Patient/Family Anticipated Services at Transition  home health care    Transportation Anticipated  family or friend will provide       Discharge Needs Assessment    Readmission Within the Last 30 Days  no previous admission in last 30 days    Equipment Currently Used at Home  oxygen    Concerns to be Addressed  adjustment to diagnosis/illness;care coordination/care conferences;basic needs;discharge planning;home safety    Outpatient/Agency/Support Group Needs  homecare agency;skilled nursing facility    Discharge Facility/Level of Care Needs  nursing facility, skilled;home with home health    Current Discharge Risk  lives alone    Discharge Coordination/Progress  Spoke with pt to discuss d/c needs. Pt states he lives at home alone and has been able to take care of himself until recently. Apparently he has fallen several times at home. Pt may be open to home health or even rehab. [facility] in [place]. Therapy will be evaluating pt soon. Will follow.        Discharge Plan    No documentation.       Continued Care and Services - Admitted Since 10/4/2021    Coordination has not been started for this encounter.         Demographic Summary    No documentation.       Functional Status    No documentation.       Psychosocial    No documentation.       Abuse/Neglect    No documentation.       Legal    No  documentation.       Substance Abuse    No documentation.       Patient Forms    No documentation.           J CARLOS Bruno

## 2021-10-05 NOTE — PLAN OF CARE
Goal Outcome Evaluation:  Plan of Care Reviewed With: patient        Progress: no change  Outcome Summary: Patient admitted to [facility]C via  [facility] ED with UTI. Patient has denied pain thus far this shift. IVF and IV abx per order. VSS. Safety maintained.

## 2021-10-06 VITALS
TEMPERATURE: 98 F | SYSTOLIC BLOOD PRESSURE: 120 MMHG | HEART RATE: 52 BPM | RESPIRATION RATE: 18 BRPM | BODY MASS INDEX: 28.64 KG/M2 | DIASTOLIC BLOOD PRESSURE: 48 MMHG | HEIGHT: 68 IN | WEIGHT: 189 LBS | OXYGEN SATURATION: 97 %

## 2021-10-06 PROBLEM — E44.0 MODERATE MALNUTRITION (HCC): Status: ACTIVE | Noted: 2021-10-06

## 2021-10-06 LAB — BACTERIA SPEC AEROBE CULT: ABNORMAL

## 2021-10-06 PROCEDURE — 96361 HYDRATE IV INFUSION ADD-ON: CPT

## 2021-10-06 PROCEDURE — 94799 UNLISTED PULMONARY SVC/PX: CPT

## 2021-10-06 PROCEDURE — G0378 HOSPITAL OBSERVATION PER HR: HCPCS

## 2021-10-06 PROCEDURE — 97535 SELF CARE MNGMENT TRAINING: CPT

## 2021-10-06 RX ORDER — CEFDINIR 300 MG/1
300 CAPSULE ORAL 2 TIMES DAILY
Qty: 10 CAPSULE | Refills: 0 | Status: SHIPPED | OUTPATIENT
Start: 2021-10-06 | End: 2021-10-11

## 2021-10-06 RX ADMIN — SODIUM CHLORIDE, POTASSIUM CHLORIDE, SODIUM LACTATE AND CALCIUM CHLORIDE 75 ML/HR: 600; 310; 30; 20 INJECTION, SOLUTION INTRAVENOUS at 05:15

## 2021-10-06 RX ADMIN — BUDESONIDE INHALATION 0.5 MG: 0.5 SUSPENSION RESPIRATORY (INHALATION) at 07:56

## 2021-10-06 RX ADMIN — LEVOTHYROXINE SODIUM 100 MCG: 100 TABLET ORAL at 05:17

## 2021-10-06 RX ADMIN — POLYETHYLENE GLYCOL 3350 17 G: 17 POWDER, FOR SOLUTION ORAL at 08:27

## 2021-10-06 RX ADMIN — GLYCERIN 2 DROP: .002; .002; .01 SOLUTION/ DROPS OPHTHALMIC at 08:27

## 2021-10-06 NOTE — CASE MANAGEMENT/SOCIAL WORK
Continued Stay Note  KARISSA Alvarado     Patient Name: Angel Hernandez  MRN: [id number]  Today's Date: 10/6/2021    Admit Date: 10/4/2021    Discharge Plan     Row Name 10/06/21 1534       Plan    Plan  Home [facility]    Patient/Family in Agreement with Plan  yes    Final Discharge Disposition Code  01 - home or self-care    Final Note  Pt is being d/c'ed home today. Therapy recommended home health. Orders written for home health, cane, bsc, and a walker. Spoke with pt about this and he does want them. [facility] and [facility] so [facility]. Asked pt about using Medicare to get the dme so that he can get it today but he does not want to. Explained to him that it may take a few days for the [facility] to get it to him and he said he still does not want to use his Medicare. Filled out the RFS form required by [facility] for the [facility] health and faxed it with supporting information to the [facility] at [phone] and to the [facility] at [phone]. Spoke with Mary Grace at the [phone] to ask about the dme. She said pt will have to come to the [facility] to be taught how to use each piece of dme before they will approve it. Informed pt of this and he said he still wants [facility] to [facility] it and that he will go to [facility] to get it. Pt is going home today.        Discharge Codes    No documentation.       Expected Discharge Date and Time     Expected Discharge Date Expected Discharge Time    Oct 6, 2021             J CARLOS Bruno

## 2021-10-06 NOTE — THERAPY TREATMENT NOTE
Acute Care - Occupational Therapy Treatment Note  [facility]     Patient Name: Angel Hernandez  : 1945  MRN: [id number]  Today's Date: 10/6/2021             Admit Date: 10/4/2021       ICD-10-CM ICD-9-CM   1. Generalized weakness  R53.1 780.79   2. Acute UTI  N39.0 599.0   3. Multiple falls  R29.6 V15.88   4. History of chronic obstructive pulmonary disease  Z87.09 V12.69   5. Decreased functional activity tolerance  R68.89 780.99   6. Decreased activities of daily living (ADL)  Z78.9 V49.89     Patient Active Problem List   Diagnosis   • Generalized weakness   • Frequent falls   • Acute UTI (urinary tract infection)   • Adult failure to thrive   • Hypothyroidism (acquired)   • Exophthalmos     Past Medical History:   Diagnosis Date   • Asthma    • COPD (chronic obstructive pulmonary disease) (HCC)    • Disease of thyroid gland    • Hyperlipidemia    • Hypertension      Past Surgical History:   Procedure Laterality Date   • COLONOSCOPY  2011    unremarkable   • FRACTURE SURGERY      jaw   • WRIST SURGERY              OT ASSESSMENT FLOWSHEET (last 12 hours)      OT Evaluation and Treatment     Row Name 10/06/21 1039                   OT Time and Intention    Subjective Information  complains of;dyspnea  -MW        Document Type  therapy note (daily note)  -MW        Mode of Treatment  occupational therapy  -MW           General Information    Existing Precautions/Restrictions  fall;oxygen therapy device and L/min  -MW           Cognition    Personal Safety Interventions  fall prevention program maintained;gait belt;muscle strengthening facilitated;nonskid shoes/slippers when out of bed;supervised activity  -MW           Pain Assessment    Additional Documentation  Pain Scale: Numbers Pre/Post-Treatment (Group)  -MW           Pain Scale: Numbers Pre/Post-Treatment    Pretreatment Pain Rating  0/10 - no pain  -MW        Posttreatment Pain Rating  0/10 - no pain  -MW           Bed Mobility    Bed Mobility   supine-sit  -MW        Supine-Sit [place] (Bed Mobility)  modified independence  -           Functional Mobility    Functional Mobility- Ind. Level  contact guard assist  -MW        Functional Mobility- Safety Issues  supplemental O2  -MW        Functional Mobility- Comment  amb to BR and back to chair, needed x5 min seated rest break in BR to recover from SOA  -MW           Transfer Assessment/Treatment    Transfers  sit-stand transfer;stand-sit transfer  -MW           Transfers    Sit-Stand [place] (Transfers)  supervision  -        Stand-Sit [place] (Transfers)  supervision  -           Activities of Daily Living    BADL Assessment/Intervention  lower body dressing;toileting  -           Lower Body Dressing Assessment/Training    [place] Level (Lower Body Dressing)  don;shoes/slippers;contact guard assist  -        Position (Lower Body Dressing)  edge of bed sitting  -        Comment (Lower Body Dressing)  SBA for dynamic sitting balance, CGA to adjust shoes for increased independence; increased SOA requiring x1-2 min seated rest following bent forward positioning for task   -           Toileting Assessment/Training    [place] Level (Toileting)  contact guard assist  -        Assistive Devices (Toileting)  commode;grab bar/safety frame  -        Position (Toileting)  supported standing  -        Comment (Toileting)  CGA for standing balance, RUE support on wall for balance  -           Plan of Care Review    Plan of Care Reviewed With  patient  -MW        Progress  improving  -        Outcome Summary  OT txt completed. Pt agreeable to therapy and motivated to return home independently. Pt satting at 94% with O2 out of one nostril upon entering room. When replaced pt satting 96%. Sup>sit mod I. CGA for assist with donning shoes, requires x1-2 min seated rest for full task completion 2' SOA, desats to 91%. S for transfers and CGA for balance with functional mobility  to BR, simulating household distance. Pt requires UE support on objects in BR for balance and x5 min seated RB following toileting and grooming tasks for SOA. Pt educated on energy conservation and fall prevention strategies with verbalized understanding. Pt will need further training on these strategies as well as endurance and balance training to decrease fall risk in context of ADL, IADL and functional mobility as pt does live independently without family to assist. Cont OT POC.  -MW           Vital Signs    Pre SpO2 (%)  94  -MW        O2 Delivery Pre Treatment  nasal cannula oxygen only in one nostril  -MW        Intra SpO2 (%)  91  -MW        O2 Delivery Intra Treatment  supplemental O2  -MW        Post SpO2 (%)  96  -MW        O2 Delivery Post Treatment  supplemental O2  -MW        Pre Patient Position  Supine  -MW        Intra Patient Position  Standing  -MW        Post Patient Position  Sitting  -MW           Positioning and Restraints    Pre-Treatment Position  in bed  -MW        Post Treatment Position  chair  -MW        In Chair  sitting;call light within reach;encouraged to call for assist  -MW           Therapy Plan Review/Discharge Plan (OT)    Anticipated Discharge Disposition (OT)  home with assist;home with home health  -MW          User Key  (r) = Recorded By, (t) = Taken By, (c) = Cosigned By    Initials Name Effective Dates    MW Angeline Grande, OTR/L 08/28/18 -            Occupational Therapy Education                 Title: PT OT SLP Therapies (In Progress)     Topic: Occupational Therapy (In Progress)     Point: ADL training (In Progress)     Description:   Instruct learner(s) on proper safety adaptation and remediation techniques during self care or transfers.   Instruct in proper use of assistive devices.              Learning Progress Summary           Patient Acceptance, E, NR by MERNA at 10/5/2021 7291                   Point: Home exercise program (In Progress)     Description:    Instruct learner(s) on appropriate technique for monitoring, assisting and/or progressing therapeutic exercises/activities.              Learning Progress Summary           Patient Acceptance, E, NR by CS at 10/5/2021 1137                   Point: Precautions (In Progress)     Description:   Instruct learner(s) on prescribed precautions during self-care and functional transfers.              Learning Progress Summary           Patient Acceptance, E, NR by CS at 10/5/2021 1137                   Point: Body mechanics (In Progress)     Description:   Instruct learner(s) on proper positioning and spine alignment during self-care, functional mobility activities and/or exercises.              Learning Progress Summary           Patient Acceptance, E, NR by CS at 10/5/2021 1137                               User Key     Initials Effective Dates Name Provider Type Discipline     06/16/21 -  Miriam Woodard, OTR/L, CNT Occupational Therapist OT                  OT Recommendation and Plan     Plan of Care Review  Plan of Care Reviewed With: patient  Progress: improving  Outcome Summary: OT txt completed. Pt agreeable to therapy and motivated to return home independently. Pt satting at 94% with O2 out of one nostril upon entering room. When replaced pt satting 96%. Sup>sit mod I. CGA for assist with donning shoes, requires x1-2 min seated rest for full task completion 2' SOA, desats to 91%. S for transfers and CGA for balance with functional mobility to BR, simulating household distance. Pt requires UE support on objects in BR for balance and x5 min seated RB following toileting and grooming tasks for SOA. Pt educated on energy conservation and fall prevention strategies with verbalized understanding. Pt will need further training on these strategies as well as endurance and balance training to decrease fall risk in context of ADL, IADL and functional mobility as pt does live independently without family to assist. Cont  OT POC.  Plan of Care Reviewed With: patient  Outcome Summary: OT txt completed. Pt agreeable to therapy and motivated to return home independently. Pt satting at 94% with O2 out of one nostril upon entering room. When replaced pt satting 96%. Sup>sit mod I. CGA for assist with donning shoes, requires x1-2 min seated rest for full task completion 2' SOA, desats to 91%. S for transfers and CGA for balance with functional mobility to BR, simulating household distance. Pt requires UE support on objects in BR for balance and x5 min seated RB following toileting and grooming tasks for SOA. Pt educated on energy conservation and fall prevention strategies with verbalized understanding. Pt will need further training on these strategies as well as endurance and balance training to decrease fall risk in context of ADL, IADL and functional mobility as pt does live independently without family to assist. Cont OT POC.        Time Calculation:   Time Calculation- OT     Row Name 10/06/21 1119             Time Calculation- OT    OT Start Time  1033  -MW      OT Stop Time  1112  -MW      OT Time Calculation (min)  39 min  -MW      Total Timed Code Minutes- OT  39 minute(s)  -MW      OT Received On  10/06/21  -MW        User Key  (r) = Recorded By, (t) = Taken By, (c) = Cosigned By    Initials Name Provider Type    Angeline Frey OTR/L Occupational Therapist        Therapy Charges for Today     Code Description Service Date Service Provider Modifiers Qty    [id number] HC OT SELF CARE/MGMT/TRAIN EA 15 MIN 10/6/2021 Angeline Grande OTR/L GO 3               NATA Scales  10/6/2021

## 2021-10-06 NOTE — DISCHARGE SUMMARY
[facility] Medicine Services  DISCHARGE SUMMARY       Date of Admission: 10/4/2021  Date of Discharge:  10/6/2021  Primary Care Physician: Ty Beal APRN    Presenting Problem/History of Present Illness:  Generalized weakness [R53.1]     Final Discharge Diagnoses:  Active Hospital Problems    Diagnosis    • **Acute UTI (urinary tract infection)    • Moderate malnutrition (CMS/HCC)    • Generalized weakness    • Frequent falls    • Adult failure to thrive    • Hypothyroidism (acquired)    • Exophthalmos        Consults: None.    Procedures Performed: None.    Pertinent Test Results:      Imaging Results (Last 72 Hours)     Procedure Component Value Units Date/Time    US Venous Doppler Lower Extremity Right (duplex) [[id number]] Collected: 10/05/21 1714     Updated: 10/05/21 1717    Narrative:      History: Right lower extremity pain, swelling       Impression:      Impression: There is no evidence of deep venous thrombosis or  superficial thrombophlebitis of the right lower extremity.     Comments: Right lower extremity venous duplex exam was performed using  color Doppler flow, Doppler wave form analysis, and grayscale imaging,  with and without compression. There is no evidence of deep venous  thrombosis of the common femoral, superficial femoral, popliteal,  posterior tibial, and peroneal veins. There is no thrombus identified in  the saphenofemoral junction or the greater saphenous vein.     This report was finalized on 10/05/2021 17:14 by Dr. Marvin Wallis MD.    US Renal Bilateral [[id number]] Collected: 10/05/21 1516     Updated: 10/05/21 1522    Narrative:      EXAMINATION: US RENAL BILATERAL- 10/5/2021 3:16 PM CDT     HISTORY: Left renal nodule     COMPARISON: None     FINDINGS:  Transabdominal sonographic evaluation of the kidneys and urinary bladder  was performed in the transverse and longitudinal projections.     The urinary bladder is partially distended and  grossly normal in  appearance.     The aorta and IVC are mostly obscured by bowel gas shadowing.     The right kidney appears normal in size and echogenicity, measuring 11.5  x 6 x 6.6 cm in size. There is no sign of collecting system dilatation  or solid renal mass.     The left kidney appears normal in size and echogenicity, measuring 11.7  x 6.8 x 5.9 cm in size. A lateral mass demonstrates increased through  transmission without internal color flow and measures 2.4 x 2.1 x 2.4  cm, compatible with a cyst. An additional tiny nodule inferiorly is too  small to accurately characterize but appears to demonstrate increased  through transmission, suggesting a cyst. This lesion measures 8 mm in  size. There is no sign of collecting system dilatation.       Impression:      Left renal cyst. Additional left renal lesion is too small  to accurately characterize but is also favored to represent a cyst.  Otherwise, negative renal ultrasound.  This report was finalized on 10/05/2021 15:18 by Dr. Porfirio Casanova MD.    CT Angiogram Chest [[id number]] Collected: 10/04/21 1818     Updated: 10/04/21 1827    Narrative:      CT ANGIOGRAM CHEST- 10/4/2021 5:49 PM CDT      HISTORY: generalized weakness/ dyspnea/ elvated dimer      COMPARISON: None.      DOSE LENGTH PRODUCT: 407 mGy cm. Automated exposure control was also  utilized to decrease patient radiation dose.     TECHNIQUE: Helical tomographic images of the chest were obtained after  the administration of intravenous contrast following angiogram protocol.  Additionally, 3D and multiplanar reformatted images were provided.        FINDINGS:    Pulmonary arteries: There is adequate enhancement of the pulmonary  arteries to evaluate for central and segmental pulmonary emboli. There  are no filling defects within the main, lobar, segmental or visualized  subsegmental pulmonary arteries. The pulmonary arteries are within  normal limits for size.      Aorta and great vessels: Vascular  calcifications present aortic arch..  The great vessels are normal in appearance.     Visualized neck base: The imaged portion of the base of the neck appears  unremarkable.      Lungs: Emphysematous changes noted in the pulmonary parenchyma.. The  trachea and bronchial tree are patent.      Heart: The heart is normal in size. Coronary calcifications are  present..      Mediastinum and lymph nodes: No enlarged mediastinal, hilar, or axillary  lymph nodes are present.      Skeletal and soft tissues: The osseous structures of the thorax and  surrounding soft tissues demonstrate no acute process.     Upper abdomen: Calculi are present in the gallbladder..        Impression:      1. No evidence of pulmonary embolus.        2. Emphysema and COPD.  3. Cholelithiasis  4. 2 cm nodule on the left renal contour is incompletely evaluated..        This report was finalized on 10/04/2021 18:24 by Dr. Austin Monzon MD.    CT Head Without Contrast [[id number]] Collected: 10/04/21 1815     Updated: 10/04/21 1820    Narrative:      EXAMINATION:   CT HEAD WO CONTRAST-  10/4/2021 6:15 PM CDT     HISTORY: Patient fell CT BRAIN without contrast 10/4/2021 5:49 PM CDT     HISTORY: Generalized weakness     COMPARISON: None      DLP: 700 mGy cm. In order to have a CT radiation dose as low as  reasonably achievable, Automated Exposure Control was utilized for  adjustment of the mA and/or KV according to patient size.     TECHNIQUE: Serial axial tomographic images of the brain were obtained  without the use of intravenous contrast.      FINDINGS:   The midline structures are nondisplaced. There is mild cerebral and  cerebellar volume loss, with an associated increase in the prominence of  the ventricles and sulci. The basilar cisterns are normal in size and  configuration. There is no evidence of intracranial hemorrhage or  mass-effect. There is low attenuation in the periventricular white  matter, consistent with chronic ischemic change. 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 There is no evidence of tonsillar herniation.      Exophthalmus is noted.. The visualized paranasal sinuses, mastoid air  cells and middle ear cavities are clear. The visualized osseous  structures and overlying soft tissues of the skull and face are intact.        Impression:      Changes of aging with no acute intracranial abnormality..        2. Exophthalmos        This report was finalized on 10/04/2021 18:17 by Dr. Austin Monzon MD.    XR Chest 1 View [[id number]] Collected: 10/04/21 1629     Updated: 10/04/21 1633    Narrative:      EXAMINATION: XR CHEST 1 VW-     10/4/2021 4:26 PM CDT     HISTORY: dyspnea     1 view chest x-ray.     No comparison.     Heart size is normal.  The mediastinum is within normal limits.      The lungs are mildly hyperexpanded with no pneumonia or pneumothorax.  Mild chronic appearing interstitial disease with a few calcified  granulomas.  No congestive failure changes.                                                                       Impression:      1. No acute disease.        This report was finalized on 10/04/2021 16:30 by Dr. Harsh Jama MD.        Lab Results (last 72 hours)     Procedure Component Value Units Date/Time    Blood Culture - Blood, Arm, Right [[id number]] Collected: 10/04/21 1901    Specimen: Blood from Arm, Right Updated: 10/05/21 2016     Blood Culture No growth at 24 hours    Blood Culture - Blood, Arm, Left [[id number]] Collected: 10/04/21 1821    Specimen: Blood from Arm, Left Updated: 10/05/21 1845     Blood Culture No growth at 24 hours    Urine Culture - Urine, Urine, Clean Catch [[id number]]  (Abnormal) Collected: 10/04/21 1719    Specimen: Urine, Clean Catch Updated: 10/05/21 1046     Urine Culture >100,000 CFU/mL Gram Negative Bacilli    Hemoglobin A1c [[id number]]  (Abnormal) Collected: 10/05/21 0543    Specimen: Blood Updated: 10/05/21 0654      Hemoglobin A1C 5.90 %     Narrative:      Hemoglobin A1C Ranges:    Increased Risk for Diabetes  5.7% to 6.4%  Diabetes                     >= 6.5%  Diabetic Goal                < 7.0%    Basic Metabolic Panel [[id number]]  (Abnormal) Collected: 10/05/21 0543    Specimen: Blood Updated: 10/05/21 0649     Glucose 78 mg/dL      BUN 29 mg/dL      Creatinine 1.09 mg/dL      Sodium 137 mmol/L      Potassium 4.3 mmol/L      Chloride 102 mmol/L      CO2 24.0 mmol/L      Calcium 8.4 mg/dL      eGFR Non African Amer 66 mL/min/1.73      BUN/Creatinine Ratio 26.6     Anion Gap 11.0 mmol/L     Narrative:      GFR Normal >60  Chronic Kidney Disease <60  Kidney Failure <15      Lipid Panel [[id number]]  (Abnormal) Collected: 10/05/21 0543    Specimen: Blood Updated: 10/05/21 0649     Total Cholesterol 107 mg/dL      Triglycerides 99 mg/dL      HDL Cholesterol 30 mg/dL      LDL Cholesterol  58 mg/dL      VLDL Cholesterol 19 mg/dL      LDL/HDL Ratio 1.91    Narrative:      Cholesterol Reference Ranges  (U.S. Department of Health and Human Services ATP III Classifications)    Desirable          <200 mg/dL  Borderline High    200-239 mg/dL  High Risk          >240 mg/dL      Triglyceride Reference Ranges  (U.S. Department of Health and Human Services ATP III Classifications)    Normal           <150 mg/dL  Borderline High  150-199 mg/dL  High             200-499 mg/dL  Very High        >500 mg/dL    HDL Reference Ranges  (U.S. Department of Health and Human Services ATP III Classifcations)    Low     <40 mg/dl (major risk factor for CHD)  High    >60 mg/dl ('negative' risk factor for CHD)        LDL Reference Ranges  (U.S. Department of Health and Human Services ATP III Classifcations)    Optimal          <100 mg/dL  Near Optimal     100-129 mg/dL  Borderline High  130-159 mg/dL  High             160-189 mg/dL  Very High        >189 mg/dL    Procalcitonin [[id number]]  (Abnormal) Collected: 10/04/21 1637    Specimen: Blood Updated:  "10/04/21 1937     Procalcitonin 0.39 ng/mL     Narrative:      As a Marker for Sepsis (Non-Neonates):     1. <0.5 ng/mL represents a low risk of severe sepsis and/or septic shock.  2. >2 ng/mL represents a high risk of severe sepsis and/or septic shock.    As a Marker for Lower Respiratory Tract Infections that require antibiotic therapy:  PCT on Admission     Antibiotic Therapy             6-12 Hrs later  >0.5                          Strongly Recommended            >0.25 - <0.5             Recommended  0.1 - 0.25                  Discouraged                       Remeasure/reassess PCT  <0.1                         Strongly Discouraged         Remeasure/reassess PCT      As 28 day mortality risk marker: \"Change in Procalcitonin Result\" (>80% or <=80%) if Day 0 (or Day 1) and Day 4 values are available. Refer to [email or web address]    Change in PCT <=80 %   A decrease of PCT levels below or equal to 80% defines a positive change in PCT test result representing a higher risk for 28-day all-cause mortality of patients diagnosed with severe sepsis or septic shock.    Change in PCT >80 %   A decrease of PCT levels of more than 80% defines a negative change in PCT result representing a lower risk for 28-day all-cause mortality of patients diagnosed with severe sepsis or septic shock.                Lactic Acid, Plasma [[id number]]  (Normal) Collected: 10/04/21 1901    Specimen: Blood Updated: 10/04/21 1931     Lactate 1.4 mmol/L     COVID PRE-OP / PRE-PROCEDURE SCREENING ORDER (NO ISOLATION) - Swab, Nasal Cavity [[id number]]  (Normal) Collected: 10/04/21 1639    Specimen: Swab from Nasal Cavity Updated: 10/04/21 1734    Narrative:      The following orders were created for panel order COVID PRE-OP / PRE-PROCEDURE SCREENING ORDER (NO ISOLATION) - Swab, Nasal Cavity.  Procedure                               Abnormality         Status                     ---------                               -----------   "       ------                     COVID-19,[employer] [employer] [facility]...[[id number]]  Normal              Final result                 Please view results for these tests on the individual orders.    COVID-19,[employer] [employer] IN-HOUSE,Nasal Swab No Transport Media 3-4 HR TAT - Swab, Nasal Cavity [[id number]]  (Normal) Collected: 10/04/21 1639    Specimen: Swab from Nasal Cavity Updated: 10/04/21 1734     COVID19 Not Detected    Narrative:      Fact sheet for providers: https://www.fda.gov/media/466439/download     Fact sheet for patients: https://www.fda.gov/media/798762/download    Test performed by PCR.    Consider negative results in combination with clinical observations, patient history, and epidemiological information.  Fact sheet for providers: https://www.fda.gov/media/570959/download     Fact sheet for patients: https://www.fda.gov/media/481938/download    Test performed by PCR.    Consider negative results in combination with clinical observations, patient history, and epidemiological information.    Urinalysis With Culture If Indicated - Urine, Clean Catch [[id number]]  (Abnormal) Collected: 10/04/21 1719    Specimen: Urine, Clean Catch Updated: 10/04/21 1729     Color, UA Dark Yellow     Appearance, UA Cloudy     pH, UA 5.5     Specific Gravity, UA 1.022     Glucose, UA Negative     Ketones, UA 15 mg/dL (1+)     Bilirubin, UA Negative     Blood, UA Large (3+)     Protein, UA >=300 mg/dL (3+)     Leuk Esterase, UA Moderate (2+)     Nitrite, UA Negative     Urobilinogen, UA 1.0 E.U./dL    Urinalysis, Microscopic Only - Urine, Clean Catch [[id number]]  (Abnormal) Collected: 10/04/21 1719    Specimen: Urine, Clean Catch Updated: 10/04/21 1729     RBC, UA 6-12 /HPF      WBC, UA Too Numerous to Count /HPF      Bacteria, UA 4+ /HPF      Squamous Epithelial Cells, UA 0-2 /HPF      Hyaline Casts, UA 0-2 /LPF      Methodology Automated Microscopy    TSH [[id number]]  (Normal) Collected: 10/04/21 1637    Specimen: Blood Updated: 10/04/21  1716     TSH 2.530 uIU/mL     Comprehensive Metabolic Panel [[id number]]  (Abnormal) Collected: 10/04/21 1637    Specimen: Blood Updated: 10/04/21 1712     Glucose 127 mg/dL      BUN 33 mg/dL      Creatinine 1.22 mg/dL      Sodium 133 mmol/L      Potassium 4.2 mmol/L      Chloride 99 mmol/L      CO2 23.0 mmol/L      Calcium 9.0 mg/dL      Total Protein 7.5 g/dL      Albumin 3.40 g/dL      ALT (SGPT) 22 U/L      AST (SGOT) 37 U/L      Alkaline Phosphatase 70 U/L      Total Bilirubin 0.5 mg/dL      eGFR Non African Amer 58 mL/min/1.73      Globulin 4.1 gm/dL      A/G Ratio 0.8 g/dL      BUN/Creatinine Ratio 27.0     Anion Gap 11.0 mmol/L     Narrative:      GFR Normal >60  Chronic Kidney Disease <60  Kidney Failure <15      Troponin [[id number]]  (Normal) Collected: 10/04/21 1637    Specimen: Blood Updated: 10/04/21 1710     Troponin T <0.010 ng/mL     Narrative:      Troponin T Reference Range:  <= 0.03 ng/mL-   Negative for AMI  >0.03 ng/mL-     Abnormal for myocardial necrosis.  Clinicians would have to utilize clinical acumen, EKG, Troponin and serial changes to determine if it is an Acute Myocardial Infarction or myocardial injury due to an underlying chronic condition.       Results may be falsely decreased if patient taking Biotin.      BNP [[id number]]  (Normal) Collected: 10/04/21 1637    Specimen: Blood Updated: 10/04/21 1709     proBNP 518.1 pg/mL     Narrative:      Among patients with dyspnea, NT-proBNP is highly sensitive for the detection of acute congestive heart failure. In addition NT-proBNP of <300 pg/ml effectively rules out acute congestive heart failure with 99% negative predictive value.    Results may be falsely decreased if patient taking Biotin.      D-dimer, Quantitative [[id number]]  (Abnormal) Collected: 10/04/21 1637    Specimen: Blood Updated: 10/04/21 1709     D-Dimer, Quantitative 1.57 mg/L (FEU)     Narrative:      Reference Range is 0-0.50 mg/L FEU. However, results <0.50 mg/L FEU tends  to rule out DVT or PE. Results >0.50 mg/L FEU are not useful in predicting absence or presence of DVT or PE.      Protime-INR [[id number]]  (Abnormal) Collected: 10/04/21 1637    Specimen: Blood Updated: 10/04/21 1709     Protime 14.4 Seconds      INR 1.16    CBC & Differential [[id number]]  (Abnormal) Collected: 10/04/21 1637    Specimen: Blood Updated: 10/04/21 1651    Narrative:      The following orders were created for panel order CBC & Differential.  Procedure                               Abnormality         Status                     ---------                               -----------         ------                     CBC Auto Differential[[id number]]        Abnormal            Final result                 Please view results for these tests on the individual orders.    CBC Auto Differential [[id number]]  (Abnormal) Collected: 10/04/21 1637    Specimen: Blood Updated: 10/04/21 1651     WBC 9.73 10*3/mm3      RBC 5.09 10*6/mm3      Hemoglobin 14.2 g/dL      Hematocrit 43.3 %      MCV 85.1 fL      MCH 27.9 pg      MCHC 32.8 g/dL      RDW 13.3 %      RDW-SD 41.8 fl      MPV 10.0 fL      Platelets 235 10*3/mm3      Neutrophil % 71.5 %      Lymphocyte % 18.2 %      Monocyte % 9.1 %      Eosinophil % 0.2 %      Basophil % 0.4 %      Immature Grans % 0.6 %      Neutrophils, Absolute 6.95 10*3/mm3      Lymphocytes, Absolute 1.77 10*3/mm3      Monocytes, Absolute 0.89 10*3/mm3      Eosinophils, Absolute 0.02 10*3/mm3      Basophils, Absolute 0.04 10*3/mm3      Immature Grans, Absolute 0.06 10*3/mm3      nRBC 0.0 /100 WBC     Blood Gas, Arterial - [[id number]]  (Abnormal) Collected: 10/04/21 1640    Specimen: Arterial Blood Updated: 10/04/21 1648     Site Right Brachial     Quinten's Test N/A     pH, Arterial 7.439 pH units      pCO2, Arterial 37.8 mm Hg      pO2, Arterial 68.4 mm Hg      Comment: 84 Value below reference range        HCO3, Arterial 25.6 mmol/L      Base Excess, Arterial 1.5 mmol/L      O2 Saturation,  Arterial 94.7 %      Temperature 37.0 C      Barometric Pressure for Blood Gas 749 mmHg      Modality Nasal Cannula     Flow Rate 1.5 lpm      Ventilator Mode NA     Collected by [id number]     Comment: Meter: [id number]     :  [id number]        pCO2, Temperature Corrected 37.8 mm Hg      pH, Temp Corrected 7.439 pH Units      pO2, Temperature Corrected 68.4 mm Hg         Chief Complaint on Day of Discharge: Overall, patient reports feeling well.  States breathing is at baseline.  Tolerating a diet.  He denies any acute complaints.    Hospital Course:  The patient is a 76 y.o. male who presented to [facility] on 10/[facility] with frequent falls and weakness.  He has a past medical history significant for hypothyroidism, chronic obstructive pulmonary disease, chronic respiratory failure on 3 to 4 L nasal cannula continuously, and essential hypertension.  He follows with the [facility] clinic in [place] for his primary care.  He was found to have significant urinary tract infection in the emergency department.  CT of the head negative for acute intracranial normality.  CT angiogram of the chest showed emphysema and COPD.  He was started on IV ceftriaxone.  He was admitted to the hospitalist service for further evaluation and management.    Blood cultures with no growth to date.  He has remained afebrile and absence of leukocytosis.  Urine culture showed E. coli susceptible to ceftriaxone.  Will transition ceftriaxone to Omnicef to complete a total of 7 days antibiotic therapy.  Renal ultrasound obtained as CT angiogram of the chest showed 2 cm nodule in the left renal contour, incompletely evaluated.  Renal ultrasound showed left renal cyst, additional left renal lesion too small to accurately characterize but is favored to represent a cyst.  Negative renal ultrasound.    He does have a history of hypertension states that he has been prescribed amlodipine, lisinopril and propanolol.   "Although, states that he has not been consistently taking his blood pressure medication here recently.  States he does have a blood pressure cuff at home and reports his blood pressure has been \"fine\" off of medication.  Heart rate 45-60s while hospitalized.  Home medication amlodipine, lisinopril, and propanolol held today. Blood pressure this afternoon 120/48.  He denies dizziness.  States he is feeling much better today. Recommend to continue holding blood pressure medications at time of discharge.  Check blood pressure at home and keep a log of readings to bring to follow-up appointment with primary care provider.  He does qualify for moderate malnutrition.  Registered dietitian recommends boost plus twice daily.  He has worked with physical and Occupational Therapy.  Therapy recommends discharge home with home health.  Overall, patient reports feeling well.  States breathing is at baseline.  Tolerating a diet.  He denies any acute complaints.  Feels that he is medically stable and appropriate for discharge home today.  Will arrange home health in addition to commode chair, walker and cane at time of discharge.  He is to follow-up with his primary care provider within 1 week.    Condition on Discharge:  Medically stable.    Physical Exam on Discharge:  /48 (BP Location: Right arm, Patient Position: Sitting)   Pulse 52   Temp 98 °F (36.7 °C) (Oral)   Resp 18   Ht 172.7 cm (68\")   Wt 85.7 kg (189 lb)   SpO2 97%   BMI 28.74 kg/m²   Physical Exam  Vitals reviewed.   Constitutional:       General: He is not in acute distress.     Appearance: He is not toxic-appearing.      Interventions: Nasal cannula in place.      Comments: Chronically ill-appearing.  No acute distress.  On home use 3 L nasal cannula.  No family at bedside.   HENT:      Head: Normocephalic and atraumatic.      Mouth/Throat:      Mouth: Mucous membranes are moist.      Pharynx: Oropharynx is clear.   Eyes:      Extraocular Movements: " Extraocular movements intact.      Conjunctiva/sclera: Conjunctivae normal.      Pupils: Pupils are equal, round, and reactive to light.   Cardiovascular:      Rate and Rhythm: Normal rate and regular rhythm.      Pulses: Normal pulses.   Pulmonary:      Effort: Pulmonary effort is normal. No respiratory distress.      Breath sounds: Normal breath sounds. No wheezing.   Abdominal:      General: Bowel sounds are normal. There is no distension.      Palpations: Abdomen is soft.      Tenderness: There is no abdominal tenderness.   Musculoskeletal:         General: No swelling or tenderness. Normal range of motion.      Cervical back: Normal range of motion and neck supple. No muscular tenderness.   Skin:     General: Skin is warm and dry.      Findings: No erythema or rash.   Neurological:      General: No focal deficit present.      Mental Status: He is alert and oriented to person, place, and time.      Cranial Nerves: No cranial nerve deficit.      Motor: No weakness.   Psychiatric:         Mood and Affect: Mood normal.         Behavior: Behavior normal.     Discharge Disposition:  Home-Health Care [facility]    Discharge Medications:     Discharge Medications      New Medications      Instructions Start Date   cefdinir 300 MG capsule  Commonly known as: OMNICEF   300 mg, Oral, 2 Times Daily      influenza vac split quad 0.5 ML suspension prefilled syringe injection  Commonly known as: FLUZONE,FLUARIX,AFLURIA,FLULAVAL   0.5 mL, Intramuscular, Once         Continue These Medications      Instructions Start Date   albuterol (2.5 MG/3ML) 0.083% nebulizer solution  Commonly known as: PROVENTIL   2.5 mg, Nebulization, Every 4 Hours PRN      ipratropium-albuterol  MCG/ACT inhaler  Commonly known as: COMBIVENT RESPIMAT   1 puff, Inhalation, 4 Times Daily PRN      LEVOTHYROXINE SODIUM PO   100 mcg, Oral      mometasone 220 MCG/INH inhaler  Commonly known as: ASMANEX   2 puffs, Inhalation, Daily - RT         Stop These  Medications    amLODIPine 10 MG tablet  Commonly known as: NORVASC     lisinopril 10 MG tablet  Commonly known as: PRINIVIL,ZESTRIL     propranolol 10 MG tablet  Commonly known as: INDERAL            Discharge Diet:   Diet Instructions     Diet: Regular, Cardiac      Discharge Diet:  Regular  Cardiac             Activity at Discharge:   Activity Instructions     Activity as Tolerated            Discharge Care Plan/Instructions:   1.  Follow-up with his [facility] Redd within 1 week.  2.  Continue Omnicef until completion.  3.  Home with home health, skilled nursing, physical and Occupational Therapy.  4.  Check blood pressure at home and keep a log of readings to bring to follow-up appointment with primary care provider.  5.  Boost plus twice daily.  6.  Seek evaluation for worsening symptoms.    Test Results Pending at Discharge: Blood cultures with no growth to date, will follow to completion.    Electronically signed by AYAKA Mancia, 10/06/21, 13:12 CDT.    Time: 35 minutes.

## 2021-10-06 NOTE — PLAN OF CARE
Goal Outcome Evaluation:  Plan of Care Reviewed With: patient        Progress: improving  Outcome Summary: OT txt completed. Pt agreeable to therapy and motivated to return home independently. Pt satting at 94% with O2 out of one nostril upon entering room. When replaced pt satting 96%. Sup>sit mod I. CGA for assist with donning shoes, requires x1-2 min seated rest for full task completion 2' SOA, desats to 91%. S for transfers and CGA for balance with functional mobility to BR, simulating household distance. Pt requires UE support on objects in BR for balance and x5 min seated RB following toileting and grooming tasks for SOA. Pt educated on energy conservation and fall prevention strategies with verbalized understanding. Pt will need further training on these strategies as well as endurance and balance training to decrease fall risk in context of ADL, IADL and functional mobility as pt does live independently without family to assist. Cont OT POC.

## 2021-10-06 NOTE — PLAN OF CARE
Goal Outcome Evaluation:  Plan of Care Reviewed With: patient        Progress: improving  Outcome Summary: Patient denies pain this shift. IVF. O2 @ 3L per NC. Continuous pulse ox. Voiding per urinal. No distress noted at this time.

## 2021-10-07 NOTE — THERAPY DISCHARGE NOTE
Acute Care - Occupational Therapy Discharge Summary  [facility]     Patient Name: Angel Hernandez  : 1945  MRN: [id number]    Today's Date: 10/7/2021                 Admit Date: 10/4/2021        OT Recommendation and Plan    Visit Dx:    ICD-10-CM ICD-9-CM   1. Generalized weakness  R53.1 780.79   2. Acute UTI  N39.0 599.0   3. Multiple falls  R29.6 V15.88   4. History of chronic obstructive pulmonary disease  Z87.09 V12.69   5. Decreased functional activity tolerance  R68.89 780.99   6. Decreased activities of daily living (ADL)  Z78.9 V49.89               OT Rehab Goals     Row Name 10/07/21 0800             Bathing Goal 1 (OT)    Activity/Device (Bathing Goal 1, OT)  bathing skills, all  -TS      [place] Level/Cues Needed (Bathing Goal 1, OT)  independent  -TS      Time Frame (Bathing Goal 1, OT)  long term goal (LTG)  -TS      Strategies/Barriers (Bathing Goal 1, OT)  with no vcs for energy conservation techniques  -TS      Progress/Outcomes (Bathing Goal 1, OT)  goal not met  -TS         Dressing Goal 1 (OT)    Activity/Device (Dressing Goal 1, OT)  dressing skills, all  -TS      [place]/Cues Needed (Dressing Goal 1, OT)  independent  -TS      Time Frame (Dressing Goal 1, OT)  long term goal (LTG)  -TS      Progress/Outcome (Dressing Goal 1, OT)  goal not met  -TS         Grooming Goal 1 (OT)    Activity/Device (Grooming Goal 1, OT)  grooming skills, all  -TS      [place] (Grooming Goal 1, OT)  independent  -TS      Time Frame (Grooming Goal 1, OT)  long term goal (LTG)  -TS      Strategies/Barriers (Grooming Goal 1, OT)  with no vcs for initiation of energy conservation techniques  -TS      Progress/Outcome (Grooming Goal 1, OT)  goal not met  -TS        User Key  (r) = Recorded By, (t) = Taken By, (c) = Cosigned By    Initials Name Provider Type Discipline    TS Melba Shah COTA Occupational Therapy Assistant OT                      OT Discharge Summary  Anticipated Discharge  Disposition (OT): home with assist  Reason for Discharge: Discharge from facility  Outcomes Achieved: Refer to plan of care for updates on goals achieved  Discharge Destination: Home with assist, Home with home health      CHACHO Campos  10/7/2021

## 2021-10-07 NOTE — PAYOR COMM NOTE
"CT HOME 10-6-21  [id number]    Angel Hernandez (76 y.o. Male)     Date of Birth Social Security Number Address Home Phone MRN    1945  [address] [phone] [id number]    [employer] Marital Status          Other        Admission Date Admission Type Admitting Provider Attending Provider Department, Room/Bed    10/4/21 Emergency Yair Zamora MD  [facility] 3C, 384/1    Discharge Date Discharge Disposition Discharge Destination        10/6/2021 Home-Health Care [facility] [place]              Attending Provider: (none)   Allergies: No Known Allergies    Isolation: None   Infection: None   Code Status: Prior    Ht: 172.7 cm (68\")   Wt: 85.7 kg (189 lb)    Admission Cmt: None   Principal Problem: Acute UTI (urinary tract infection) [N39.0]                 Active Insurance as of 10/4/2021     Primary Coverage     Payor Plan Insurance Group Employer/Plan Group    [facility] [employer]      Payor Plan Address Payor Plan Phone Number Payor Plan Fax Number Effective Dates    [address] [phone]  6/1/2021 - None Entered    [facility] [id number]       Subscriber Name Subscriber Birth Date Member ID       ANGEL HERNANDEZ 1945 [id number]                 Emergency Contacts          No emergency contacts on file.               Discharge Summary      Dariana Hoover APRN at 10/06/21 1005     Attestation signed by Yair Zamora MD at 10/06/21 3988    I personally evaluated and examined the patient in conjunction with AYAKA Cesar and agree with the assessment, treatment plan, and disposition of the patient as recorded by her. My history, exam, and further recommendations are:     S:  Doing ok, afebrile, feels stronger    O:  CVS RRR    A:  UTI    P:  ok for d/c on PO abx    Electronically signed by Yair Zamora MD, 10/6/2021, 18:34 CDT.                           [facility]  DISCHARGE SUMMARY "       Date of Admission: 10/4/2021  Date of Discharge:  10/6/2021  Primary Care Physician: Ty Beal APRN    Presenting Problem/History of Present Illness:  Generalized weakness [R53.1]     Final Discharge Diagnoses:  Active Hospital Problems    Diagnosis    • **Acute UTI (urinary tract infection)    • Moderate malnutrition (CMS/HCC)    • Generalized weakness    • Frequent falls    • Adult failure to thrive    • Hypothyroidism (acquired)    • Exophthalmos        Consults: None.    Procedures Performed: None.    Pertinent Test Results:      Imaging Results (Last 72 Hours)     Procedure Component Value Units Date/Time    US Venous Doppler Lower Extremity Right (duplex) [[id number]] Collected: 10/05/21 1714     Updated: 10/05/21 1717    Narrative:      History: Right lower extremity pain, swelling       Impression:      Impression: There is no evidence of deep venous thrombosis or  superficial thrombophlebitis of the right lower extremity.     Comments: Right lower extremity venous duplex exam was performed using  color Doppler flow, Doppler wave form analysis, and grayscale imaging,  with and without compression. There is no evidence of deep venous  thrombosis of the common femoral, superficial femoral, popliteal,  posterior tibial, and peroneal veins. There is no thrombus identified in  the saphenofemoral junction or the greater saphenous vein.     This report was finalized on 10/05/2021 17:14 by Dr. Marvin Wallis MD.    US Renal Bilateral [[id number]] Collected: 10/05/21 1516     Updated: 10/05/21 1522    Narrative:      EXAMINATION: US RENAL BILATERAL- 10/5/2021 3:16 PM CDT     HISTORY: Left renal nodule     COMPARISON: None     FINDINGS:  Transabdominal sonographic evaluation of the kidneys and urinary bladder  was performed in the transverse and longitudinal projections.     The urinary bladder is partially distended and grossly normal in  appearance.     The aorta and IVC are mostly obscured by bowel gas  shadowing.     The right kidney appears normal in size and echogenicity, measuring 11.5  x 6 x 6.6 cm in size. There is no sign of collecting system dilatation  or solid renal mass.     The left kidney appears normal in size and echogenicity, measuring 11.7  x 6.8 x 5.9 cm in size. A lateral mass demonstrates increased through  transmission without internal color flow and measures 2.4 x 2.1 x 2.4  cm, compatible with a cyst. An additional tiny nodule inferiorly is too  small to accurately characterize but appears to demonstrate increased  through transmission, suggesting a cyst. This lesion measures 8 mm in  size. There is no sign of collecting system dilatation.       Impression:      Left renal cyst. Additional left renal lesion is too small  to accurately characterize but is also favored to represent a cyst.  Otherwise, negative renal ultrasound.  This report was finalized on 10/05/2021 15:18 by Dr. Porfirio Casanova MD.    CT Angiogram Chest [[id number]] Collected: 10/04/21 1818     Updated: 10/04/21 1827    Narrative:      CT ANGIOGRAM CHEST- 10/4/2021 5:49 PM CDT      HISTORY: generalized weakness/ dyspnea/ elvated dimer      COMPARISON: None.      DOSE LENGTH PRODUCT: 407 mGy cm. Automated exposure control was also  utilized to decrease patient radiation dose.     TECHNIQUE: Helical tomographic images of the chest were obtained after  the administration of intravenous contrast following angiogram protocol.  Additionally, 3D and multiplanar reformatted images were provided.        FINDINGS:    Pulmonary arteries: There is adequate enhancement of the pulmonary  arteries to evaluate for central and segmental pulmonary emboli. There  are no filling defects within the main, lobar, segmental or visualized  subsegmental pulmonary arteries. The pulmonary arteries are within  normal limits for size.      Aorta and great vessels: Vascular calcifications present aortic arch..  The great vessels are normal in appearance.      Visualized neck base: The imaged portion of the base of the neck appears  unremarkable.      Lungs: Emphysematous changes noted in the pulmonary parenchyma.. The  trachea and bronchial tree are patent.      Heart: The heart is normal in size. Coronary calcifications are  present..      Mediastinum and lymph nodes: No enlarged mediastinal, hilar, or axillary  lymph nodes are present.      Skeletal and soft tissues: The osseous structures of the thorax and  surrounding soft tissues demonstrate no acute process.     Upper abdomen: Calculi are present in the gallbladder..        Impression:      1. No evidence of pulmonary embolus.        2. Emphysema and COPD.  3. Cholelithiasis  4. 2 cm nodule on the left renal contour is incompletely evaluated..        This report was finalized on 10/04/2021 18:24 by Dr. Austin Monzon MD.    CT Head Without Contrast [[id number]] Collected: 10/04/21 1815     Updated: 10/04/21 1820    Narrative:      EXAMINATION:   CT HEAD WO CONTRAST-  10/4/2021 6:15 PM CDT     HISTORY: Patient fell CT BRAIN without contrast 10/4/2021 5:49 PM CDT     HISTORY: Generalized weakness     COMPARISON: None      DLP: 700 mGy cm. In order to have a CT radiation dose as low as  reasonably achievable, Automated Exposure Control was utilized for  adjustment of the mA and/or KV according to patient size.     TECHNIQUE: Serial axial tomographic images of the brain were obtained  without the use of intravenous contrast.      FINDINGS:   The midline structures are nondisplaced. There is mild cerebral and  cerebellar volume loss, with an associated increase in the prominence of  the ventricles and sulci. The basilar cisterns are normal in size and  configuration. There is no evidence of intracranial hemorrhage or  mass-effect. There is low attenuation in the periventricular white  matter, consistent with chronic ischemic change. Old lacunar infarctions  noted right basal ganglia There are no abnormal extra-axial  fluid  collections. There is no evidence of tonsillar herniation.      Exophthalmus is noted.. The visualized paranasal sinuses, mastoid air  cells and middle ear cavities are clear. The visualized osseous  structures and overlying soft tissues of the skull and face are intact.        Impression:      Changes of aging with no acute intracranial abnormality..        2. Exophthalmos        This report was finalized on 10/04/2021 18:17 by Dr. Austin Monzon MD.    XR Chest 1 View [[id number]] Collected: 10/04/21 1629     Updated: 10/04/21 1633    Narrative:      EXAMINATION: XR CHEST 1 VW-     10/4/2021 4:26 PM CDT     HISTORY: dyspnea     1 view chest x-ray.     No comparison.     Heart size is normal.  The mediastinum is within normal limits.      The lungs are mildly hyperexpanded with no pneumonia or pneumothorax.  Mild chronic appearing interstitial disease with a few calcified  granulomas.  No congestive failure changes.                                                                       Impression:      1. No acute disease.        This report was finalized on 10/04/2021 16:30 by Dr. Harsh Jama MD.        Lab Results (last 72 hours)     Procedure Component Value Units Date/Time    Blood Culture - Blood, Arm, Right [[id number]] Collected: 10/04/21 1901    Specimen: Blood from Arm, Right Updated: 10/05/21 2016     Blood Culture No growth at 24 hours    Blood Culture - Blood, Arm, Left [[id number]] Collected: 10/04/21 1821    Specimen: Blood from Arm, Left Updated: 10/05/21 1845     Blood Culture No growth at 24 hours    Urine Culture - Urine, Urine, Clean Catch [[id number]]  (Abnormal) Collected: 10/04/21 1719    Specimen: Urine, Clean Catch Updated: 10/05/21 1046     Urine Culture >100,000 CFU/mL Gram Negative Bacilli    Hemoglobin A1c [[id number]]  (Abnormal) Collected: 10/05/21 0543    Specimen: Blood Updated: 10/05/21 0654     Hemoglobin A1C 5.90 %     Narrative:      Hemoglobin A1C Ranges:    Increased Risk  for Diabetes  5.7% to 6.4%  Diabetes                     >= 6.5%  Diabetic Goal                < 7.0%    Basic Metabolic Panel [[id number]]  (Abnormal) Collected: 10/05/21 0543    Specimen: Blood Updated: 10/05/21 0649     Glucose 78 mg/dL      BUN 29 mg/dL      Creatinine 1.09 mg/dL      Sodium 137 mmol/L      Potassium 4.3 mmol/L      Chloride 102 mmol/L      CO2 24.0 mmol/L      Calcium 8.4 mg/dL      eGFR Non African Amer 66 mL/min/1.73      BUN/Creatinine Ratio 26.6     Anion Gap 11.0 mmol/L     Narrative:      GFR Normal >60  Chronic Kidney Disease <60  Kidney Failure <15      Lipid Panel [[id number]]  (Abnormal) Collected: 10/05/21 0543    Specimen: Blood Updated: 10/05/21 0649     Total Cholesterol 107 mg/dL      Triglycerides 99 mg/dL      HDL Cholesterol 30 mg/dL      LDL Cholesterol  58 mg/dL      VLDL Cholesterol 19 mg/dL      LDL/HDL Ratio 1.91    Narrative:      Cholesterol Reference Ranges  (U.S. Department of Health and Human Services ATP III Classifications)    Desirable          <200 mg/dL  Borderline High    200-239 mg/dL  High Risk          >240 mg/dL      Triglyceride Reference Ranges  (U.S. Department of Health and Human Services ATP III Classifications)    Normal           <150 mg/dL  Borderline High  150-199 mg/dL  High             200-499 mg/dL  Very High        >500 mg/dL    HDL Reference Ranges  (U.S. Department of Health and Human Services ATP III Classifcations)    Low     <40 mg/dl (major risk factor for CHD)  High    >60 mg/dl ('negative' risk factor for CHD)        LDL Reference Ranges  (U.S. Department of Health and Human Services ATP III Classifcations)    Optimal          <100 mg/dL  Near Optimal     100-129 mg/dL  Borderline High  130-159 mg/dL  High             160-189 mg/dL  Very High        >189 mg/dL    Procalcitonin [[id number]]  (Abnormal) Collected: 10/04/21 1637    Specimen: Blood Updated: 10/04/21 1937     Procalcitonin 0.39 ng/mL     Narrative:      As a Marker for Sepsis  "(Non-Neonates):     1. <0.5 ng/mL represents a low risk of severe sepsis and/or septic shock.  2. >2 ng/mL represents a high risk of severe sepsis and/or septic shock.    As a Marker for Lower Respiratory Tract Infections that require antibiotic therapy:  PCT on Admission     Antibiotic Therapy             6-12 Hrs later  >0.5                          Strongly Recommended            >0.25 - <0.5             Recommended  0.1 - 0.25                  Discouraged                       Remeasure/reassess PCT  <0.1                         Strongly Discouraged         Remeasure/reassess PCT      As 28 day mortality risk marker: \"Change in Procalcitonin Result\" (>80% or <=80%) if Day 0 (or Day 1) and Day 4 values are available. Refer to http://www.ArthaYantrapct-calculator.com/    Change in PCT <=80 %   A decrease of PCT levels below or equal to 80% defines a positive change in PCT test result representing a higher risk for 28-day all-cause mortality of patients diagnosed with severe sepsis or septic shock.    Change in PCT >80 %   A decrease of PCT levels of more than 80% defines a negative change in PCT result representing a lower risk for 28-day all-cause mortality of patients diagnosed with severe sepsis or septic shock.                Lactic Acid, Plasma [[id number]]  (Normal) Collected: 10/04/21 1901    Specimen: Blood Updated: 10/04/21 1931     Lactate 1.4 mmol/L     COVID PRE-OP / PRE-PROCEDURE SCREENING ORDER (NO ISOLATION) - Swab, Nasal Cavity [[id number]]  (Normal) Collected: 10/04/21 1639    Specimen: Swab from Nasal Cavity Updated: 10/04/21 8044    Narrative:      The following orders were created for panel order COVID PRE-OP / PRE-PROCEDURE SCREENING ORDER (NO ISOLATION) - Swab, Nasal Cavity.  Procedure                               Abnormality         Status                     ---------                               -----------         ------                     COVID-19,[facility] [employer] IN-RI[facility]...[[id number]]  Normal       "        Final result                 Please view results for these tests on the individual orders.    COVID-19,[facility] [employer] IN-HOUSE,Nasal Swab No Transport Media 3-4 HR TAT - Swab, Nasal Cavity [[id number]]  (Normal) Collected: 10/04/21 1639    Specimen: Swab from Nasal Cavity Updated: 10/04/21 1734     COVID19 Not Detected    Narrative:      Fact sheet for providers: https://www.fda.gov/media/502738/download     Fact sheet for patients: https://www.fda.gov/media/183901/download    Test performed by PCR.    Consider negative results in combination with clinical observations, patient history, and epidemiological information.  Fact sheet for providers: https://www.fda.gov/media/984583/download     Fact sheet for patients: https://www.fda.gov/media/741129/download    Test performed by PCR.    Consider negative results in combination with clinical observations, patient history, and epidemiological information.    Urinalysis With Culture If Indicated - Urine, Clean Catch [[id number]]  (Abnormal) Collected: 10/04/21 1719    Specimen: Urine, Clean Catch Updated: 10/04/21 1729     Color, UA Dark Yellow     Appearance, UA Cloudy     pH, UA 5.5     Specific Gravity, UA 1.022     Glucose, UA Negative     Ketones, UA 15 mg/dL (1+)     Bilirubin, UA Negative     Blood, UA Large (3+)     Protein, UA >=300 mg/dL (3+)     Leuk Esterase, UA Moderate (2+)     Nitrite, UA Negative     Urobilinogen, UA 1.0 E.U./dL    Urinalysis, Microscopic Only - Urine, Clean Catch [[id number]]  (Abnormal) Collected: 10/04/21 1719    Specimen: Urine, Clean Catch Updated: 10/04/21 1729     RBC, UA 6-12 /HPF      WBC, UA Too Numerous to Count /HPF      Bacteria, UA 4+ /HPF      Squamous Epithelial Cells, UA 0-2 /HPF      Hyaline Casts, UA 0-2 /LPF      Methodology Automated Microscopy    TSH [[id number]]  (Normal) Collected: 10/04/21 1637    Specimen: Blood Updated: 10/04/21 1716     TSH 2.530 uIU/mL     Comprehensive Metabolic Panel [[id number]]  (Abnormal)  Collected: 10/04/21 1637    Specimen: Blood Updated: 10/04/21 1712     Glucose 127 mg/dL      BUN 33 mg/dL      Creatinine 1.22 mg/dL      Sodium 133 mmol/L      Potassium 4.2 mmol/L      Chloride 99 mmol/L      CO2 23.0 mmol/L      Calcium 9.0 mg/dL      Total Protein 7.5 g/dL      Albumin 3.40 g/dL      ALT (SGPT) 22 U/L      AST (SGOT) 37 U/L      Alkaline Phosphatase 70 U/L      Total Bilirubin 0.5 mg/dL      eGFR Non African Amer 58 mL/min/1.73      Globulin 4.1 gm/dL      A/G Ratio 0.8 g/dL      BUN/Creatinine Ratio 27.0     Anion Gap 11.0 mmol/L     Narrative:      GFR Normal >60  Chronic Kidney Disease <60  Kidney Failure <15      Troponin [[id number]]  (Normal) Collected: 10/04/21 1637    Specimen: Blood Updated: 10/04/21 1710     Troponin T <0.010 ng/mL     Narrative:      Troponin T Reference Range:  <= 0.03 ng/mL-   Negative for AMI  >0.03 ng/mL-     Abnormal for myocardial necrosis.  Clinicians would have to utilize clinical acumen, EKG, Troponin and serial changes to determine if it is an Acute Myocardial Infarction or myocardial injury due to an underlying chronic condition.       Results may be falsely decreased if patient taking Biotin.      BNP [[id number]]  (Normal) Collected: 10/04/21 1637    Specimen: Blood Updated: 10/04/21 1709     proBNP 518.1 pg/mL     Narrative:      Among patients with dyspnea, NT-proBNP is highly sensitive for the detection of acute congestive heart failure. In addition NT-proBNP of <300 pg/ml effectively rules out acute congestive heart failure with 99% negative predictive value.    Results may be falsely decreased if patient taking Biotin.      D-dimer, Quantitative [[id number]]  (Abnormal) Collected: 10/04/21 1637    Specimen: Blood Updated: 10/04/21 1709     D-Dimer, Quantitative 1.57 mg/L (FEU)     Narrative:      Reference Range is 0-0.50 mg/L FEU. However, results <0.50 mg/L FEU tends to rule out DVT or PE. Results >0.50 mg/L FEU are not useful in predicting absence  or presence of DVT or PE.      Protime-INR [[id number]]  (Abnormal) Collected: 10/04/21 1637    Specimen: Blood Updated: 10/04/21 1709     Protime 14.4 Seconds      INR 1.16    CBC & Differential [[id number]]  (Abnormal) Collected: 10/04/21 1637    Specimen: Blood Updated: 10/04/21 1651    Narrative:      The following orders were created for panel order CBC & Differential.  Procedure                               Abnormality         Status                     ---------                               -----------         ------                     CBC Auto Differential[[id number]]        Abnormal            Final result                 Please view results for these tests on the individual orders.    CBC Auto Differential [[id number]]  (Abnormal) Collected: 10/04/21 1637    Specimen: Blood Updated: 10/04/21 1651     WBC 9.73 10*3/mm3      RBC 5.09 10*6/mm3      Hemoglobin 14.2 g/dL      Hematocrit 43.3 %      MCV 85.1 fL      MCH 27.9 pg      MCHC 32.8 g/dL      RDW 13.3 %      RDW-SD 41.8 fl      MPV 10.0 fL      Platelets 235 10*3/mm3      Neutrophil % 71.5 %      Lymphocyte % 18.2 %      Monocyte % 9.1 %      Eosinophil % 0.2 %      Basophil % 0.4 %      Immature Grans % 0.6 %      Neutrophils, Absolute 6.95 10*3/mm3      Lymphocytes, Absolute 1.77 10*3/mm3      Monocytes, Absolute 0.89 10*3/mm3      Eosinophils, Absolute 0.02 10*3/mm3      Basophils, Absolute 0.04 10*3/mm3      Immature Grans, Absolute 0.06 10*3/mm3      nRBC 0.0 /100 WBC     Blood Gas, Arterial - [[id number]]  (Abnormal) Collected: 10/04/21 1640    Specimen: Arterial Blood Updated: 10/04/21 1648     Site Right Brachial     Quinten's Test N/A     pH, Arterial 7.439 pH units      pCO2, Arterial 37.8 mm Hg      pO2, Arterial 68.4 mm Hg      Comment: 84 Value below reference range        HCO3, Arterial 25.6 mmol/L      Base Excess, Arterial 1.5 mmol/L      O2 Saturation, Arterial 94.7 %      Temperature 37.0 C      Barometric Pressure for Blood Gas 749 mmHg       Modality Nasal Cannula     Flow Rate 1.5 lpm      Ventilator Mode NA     Collected by [id number]     Comment: Meter: [id number]     :  [id number]        pCO2, Temperature Corrected 37.8 mm Hg      pH, Temp Corrected 7.439 pH Units      pO2, Temperature Corrected 68.4 mm Hg         Chief Complaint on Day of Discharge: Overall, patient reports feeling well.  States breathing is at baseline.  Tolerating a diet.  He denies any acute complaints.    Hospital Course:  The patient is a 76 y.o. male who presented to [facility] on 10/4 with frequent falls and weakness.  He has a past medical history significant for hypothyroidism, chronic obstructive pulmonary disease, chronic respiratory failure on 3 to 4 L nasal cannula continuously, and essential hypertension.  He follows with the [facility] [place] for his primary care.  He was found to have significant urinary tract infection in the emergency department.  CT of the head negative for acute intracranial normality.  CT angiogram of the chest showed emphysema and COPD.  He was started on IV ceftriaxone.  He was admitted to the hospitalist service for further evaluation and management.    Blood cultures with no growth to date.  He has remained afebrile and absence of leukocytosis.  Urine culture showed E. coli susceptible to ceftriaxone.  Will transition ceftriaxone to Omnicef to complete a total of 7 days antibiotic therapy.  Renal ultrasound obtained as CT angiogram of the chest showed 2 cm nodule in the left renal contour, incompletely evaluated.  Renal ultrasound showed left renal cyst, additional left renal lesion too small to accurately characterize but is favored to represent a cyst.  Negative renal ultrasound.    He does have a history of hypertension states that he has been prescribed amlodipine, lisinopril and propanolol.  Although, states that he has not been consistently taking his blood pressure medication  "here recently.  States he does have a blood pressure cuff at home and reports his blood pressure has been \"fine\" off of medication.  Heart rate 45-60s while hospitalized.  Home medication amlodipine, lisinopril, and propanolol held today. Blood pressure this afternoon 120/48.  He denies dizziness.  States he is feeling much better today. Recommend to continue holding blood pressure medications at time of discharge.  Check blood pressure at home and keep a log of readings to bring to follow-up appointment with primary care provider.  He does qualify for moderate malnutrition.  Registered dietitian recommends boost plus twice daily.  He has worked with physical and Occupational Therapy.  Therapy recommends discharge home with home health.  Overall, patient reports feeling well.  States breathing is at baseline.  Tolerating a diet.  He denies any acute complaints.  Feels that he is medically stable and appropriate for discharge home today.  Will arrange home health in addition to commode chair, walker and cane at time of discharge.  He is to follow-up with his primary care provider within 1 week.    Condition on Discharge:  Medically stable.    Physical Exam on Discharge:  /48 (BP Location: Right arm, Patient Position: Sitting)   Pulse 52   Temp 98 °F (36.7 °C) (Oral)   Resp 18   Ht 172.7 cm (68\")   Wt 85.7 kg (189 lb)   SpO2 97%   BMI 28.74 kg/m²   Physical Exam  Vitals reviewed.   Constitutional:       General: He is not in acute distress.     Appearance: He is not toxic-appearing.      Interventions: Nasal cannula in place.      Comments: Chronically ill-appearing.  No acute distress.  On home use 3 L nasal cannula.  No family at bedside.   HENT:      Head: Normocephalic and atraumatic.      Mouth/Throat:      Mouth: Mucous membranes are moist.      Pharynx: Oropharynx is clear.   Eyes:      Extraocular Movements: Extraocular movements intact.      Conjunctiva/sclera: Conjunctivae normal.      Pupils: " Pupils are equal, round, and reactive to light.   Cardiovascular:      Rate and Rhythm: Normal rate and regular rhythm.      Pulses: Normal pulses.   Pulmonary:      Effort: Pulmonary effort is normal. No respiratory distress.      Breath sounds: Normal breath sounds. No wheezing.   Abdominal:      General: Bowel sounds are normal. There is no distension.      Palpations: Abdomen is soft.      Tenderness: There is no abdominal tenderness.   Musculoskeletal:         General: No swelling or tenderness. Normal range of motion.      Cervical back: Normal range of motion and neck supple. No muscular tenderness.   Skin:     General: Skin is warm and dry.      Findings: No erythema or rash.   Neurological:      General: No focal deficit present.      Mental Status: He is alert and oriented to person, place, and time.      Cranial Nerves: No cranial nerve deficit.      Motor: No weakness.   Psychiatric:         Mood and Affect: Mood normal.         Behavior: Behavior normal.     Discharge Disposition:  Home-Health Care [facility]    Discharge Medications:     Discharge Medications      New Medications      Instructions Start Date   cefdinir 300 MG capsule  Commonly known as: OMNICEF   300 mg, Oral, 2 Times Daily      influenza vac split quad 0.5 ML suspension prefilled syringe injection  Commonly known as: FLUZONE,FLUARIX,AFLURIA,FLULAVAL   0.5 mL, Intramuscular, Once         Continue These Medications      Instructions Start Date   albuterol (2.5 MG/3ML) 0.083% nebulizer solution  Commonly known as: PROVENTIL   2.5 mg, Nebulization, Every 4 Hours PRN      ipratropium-albuterol  MCG/ACT inhaler  Commonly known as: COMBIVENT RESPIMAT   1 puff, Inhalation, 4 Times Daily PRN      LEVOTHYROXINE SODIUM PO   100 mcg, Oral      mometasone 220 MCG/INH inhaler  Commonly known as: ASMANEX   2 puffs, Inhalation, Daily - RT         Stop These Medications    amLODIPine 10 MG tablet  Commonly known as: NORVASC     lisinopril 10 MG  tablet  Commonly known as: PRINIVIL,ZESTRIL     propranolol 10 MG tablet  Commonly known as: INDERAL            Discharge Diet:   Diet Instructions     Diet: Regular, Cardiac      Discharge Diet:  Regular  Cardiac             Activity at Discharge:   Activity Instructions     Activity as Tolerated            Discharge Care Plan/Instructions:   1.  Follow-up with his AYAKA Redd within 1 week.  2.  Continue Omnicef until completion.  3.  Home with home health, skilled nursing, physical and Occupational Therapy.  4.  Check blood pressure at home and keep a log of readings to bring to follow-up appointment with primary care provider.  5.  Boost plus twice daily.  6.  Seek evaluation for worsening symptoms.    Test Results Pending at Discharge: Blood cultures with no growth to date, will follow to completion.    Electronically signed by AYAKA Mancia, 10/06/21, 13:12 CDT.    Time: 35 minutes.          Electronically signed by Yair Zamora MD at 10/06/21 [id number]

## 2021-10-07 NOTE — THERAPY DISCHARGE NOTE
Acute Care - Physical Therapy Discharge Summary  [facility]       Patient Name: Angel Hernandez  : 1945  MRN: [id number]    Today's Date: 10/7/2021                 Admit Date: 10/4/2021      PT Recommendation and Plan    Visit Dx:    ICD-10-CM ICD-9-CM   1. Generalized weakness  R53.1 780.79   2. Acute UTI  N39.0 599.0   3. Multiple falls  R29.6 V15.88   4. History of chronic obstructive pulmonary disease  Z87.09 V12.69   5. Decreased functional activity tolerance  R68.89 780.99   6. Decreased activities of daily living (ADL)  Z78.9 V49.89               PT Rehab Goals     Row Name 10/07/21 0719             Bed Mobility Goal 1 (PT)    Activity/Assistive Device (Bed Mobility Goal 1, PT)  bed mobility activities, all  -MF      [place] Level/Cues Needed (Bed Mobility Goal 1, PT)  independent  -MF      Time Frame (Bed Mobility Goal 1, PT)  long term goal (LTG);by discharge  -MF      Progress/Outcomes (Bed Mobility Goal 1, PT)  goal not met  -MF         Transfer Goal 1 (PT)    Activity/Assistive Device (Transfer Goal 1, PT)  sit-to-stand/stand-to-sit;bed-to-chair/chair-to-bed  -MF      [place] Level/Cues Needed (Transfer Goal 1, PT)  independent  -MF      Time Frame (Transfer Goal 1, PT)  long term goal (LTG);by discharge  -MF      Progress/Outcome (Transfer Goal 1, PT)  goal not met  -MF         Gait Training Goal 1 (PT)    Activity/Assistive Device (Gait Training Goal 1, PT)  gait (walking locomotion);increase endurance/gait distance  -MF      [place] Level (Gait Training Goal 1, PT)  independent  -MF      Distance (Gait Training Goal 1, PT)  75ft without need for rest break  -MF      Time Frame (Gait Training Goal 1, PT)  long term goal (LTG);by discharge  -MF      Progress/Outcome (Gait Training Goal 1, PT)  goal not met  -MF         Stairs Goal 1 (PT)    Activity/Assistive Device (Stairs Goal 1, PT)  ascending stairs;descending stairs;step-to-step;using handrail, right;using handrail, left  -       [place] Level/Cues Needed (Stairs Goal 1, PT)  modified independence  -MF      Number of Stairs (Stairs Goal 1, PT)  2  -MF      Time Frame (Stairs Goal 1, PT)  long term goal (LTG);by discharge  -MF      Progress/Outcome (Stairs Goal 1, PT)  goal not met  -MF         Problem Specific Goal 1 (PT)    Problem Specific Goal 1 (PT)  pt will perform 5 minutes of continous activity while maintaining O2 sat at or above 90%  -MF      Time Frame (Problem Specific Goal 1, PT)  long-term goal (LTG);by discharge  -MF      Progress/Outcome (Problem Specific Goal 1, PT)  goal not met  -MF        User Key  (r) = Recorded By, (t) = Taken By, (c) = Cosigned By    Initials Name Provider Type Discipline     Mikaela Perez, PTA Physical Therapy Assistant PT              PT Discharge Summary  Anticipated Discharge Disposition (PT): home with home health  Reason for Discharge: Discharge from facility  Outcomes Achieved: Unable to make functional progress toward goals at this time  Discharge Destination: Home with home health      Mikaela Perez PTA   10/7/2021

## 2021-10-09 LAB
BACTERIA SPEC AEROBE CULT: NORMAL
BACTERIA SPEC AEROBE CULT: NORMAL

## 2024-02-12 ENCOUNTER — HOSPITAL ENCOUNTER (OUTPATIENT)
Dept: ULTRASOUND IMAGING | Age: 79
Discharge: HOME | End: 2024-02-12
Payer: MEDICARE

## 2024-02-12 DIAGNOSIS — N18.32 STAGE 3B CHRONIC KIDNEY DISEASE (HCC): ICD-10-CM

## 2024-02-12 PROCEDURE — 76770 US EXAM ABDO BACK WALL COMP: CPT
